# Patient Record
Sex: MALE | URBAN - METROPOLITAN AREA
[De-identification: names, ages, dates, MRNs, and addresses within clinical notes are randomized per-mention and may not be internally consistent; named-entity substitution may affect disease eponyms.]

---

## 2018-01-01 ENCOUNTER — INPATIENT (INPATIENT)
Facility: HOSPITAL | Age: 83
LOS: 0 days | DRG: 190 | End: 2018-02-06
Attending: INTERNAL MEDICINE | Admitting: INTERNAL MEDICINE
Payer: MEDICARE

## 2018-01-01 ENCOUNTER — OUTPATIENT (OUTPATIENT)
Dept: OUTPATIENT SERVICES | Facility: HOSPITAL | Age: 83
LOS: 1 days | End: 2018-01-01
Payer: MEDICAID

## 2018-01-01 ENCOUNTER — INPATIENT (INPATIENT)
Facility: HOSPITAL | Age: 83
LOS: 4 days | Discharge: HOSPICE | DRG: 871 | End: 2018-02-02
Attending: INTERNAL MEDICINE | Admitting: INTERNAL MEDICINE
Payer: MEDICARE

## 2018-01-01 VITALS
WEIGHT: 139.99 LBS | HEART RATE: 134 BPM | OXYGEN SATURATION: 92 % | RESPIRATION RATE: 22 BRPM | SYSTOLIC BLOOD PRESSURE: 96 MMHG | DIASTOLIC BLOOD PRESSURE: 73 MMHG | HEIGHT: 64 IN

## 2018-01-01 VITALS
OXYGEN SATURATION: 91 % | RESPIRATION RATE: 26 BRPM | DIASTOLIC BLOOD PRESSURE: 107 MMHG | HEART RATE: 123 BPM | SYSTOLIC BLOOD PRESSURE: 152 MMHG

## 2018-01-01 VITALS
OXYGEN SATURATION: 90 % | DIASTOLIC BLOOD PRESSURE: 74 MMHG | SYSTOLIC BLOOD PRESSURE: 105 MMHG | HEART RATE: 87 BPM | TEMPERATURE: 98 F | RESPIRATION RATE: 17 BRPM

## 2018-01-01 VITALS
TEMPERATURE: 97 F | HEART RATE: 110 BPM | RESPIRATION RATE: 24 BRPM | OXYGEN SATURATION: 99 % | DIASTOLIC BLOOD PRESSURE: 74 MMHG | SYSTOLIC BLOOD PRESSURE: 96 MMHG

## 2018-01-01 DIAGNOSIS — Z29.9 ENCOUNTER FOR PROPHYLACTIC MEASURES, UNSPECIFIED: ICD-10-CM

## 2018-01-01 DIAGNOSIS — E87.1 HYPO-OSMOLALITY AND HYPONATREMIA: ICD-10-CM

## 2018-01-01 DIAGNOSIS — Z71.89 OTHER SPECIFIED COUNSELING: ICD-10-CM

## 2018-01-01 DIAGNOSIS — Z51.5 ENCOUNTER FOR PALLIATIVE CARE: ICD-10-CM

## 2018-01-01 DIAGNOSIS — R06.02 SHORTNESS OF BREATH: ICD-10-CM

## 2018-01-01 DIAGNOSIS — R69 ILLNESS, UNSPECIFIED: ICD-10-CM

## 2018-01-01 DIAGNOSIS — C34.90 MALIGNANT NEOPLASM OF UNSPECIFIED PART OF UNSPECIFIED BRONCHUS OR LUNG: ICD-10-CM

## 2018-01-01 DIAGNOSIS — J96.90 RESPIRATORY FAILURE, UNSPECIFIED, UNSPECIFIED WHETHER WITH HYPOXIA OR HYPERCAPNIA: ICD-10-CM

## 2018-01-01 DIAGNOSIS — E11.9 TYPE 2 DIABETES MELLITUS WITHOUT COMPLICATIONS: ICD-10-CM

## 2018-01-01 DIAGNOSIS — E43 UNSPECIFIED SEVERE PROTEIN-CALORIE MALNUTRITION: ICD-10-CM

## 2018-01-01 DIAGNOSIS — R53.81 OTHER MALAISE: ICD-10-CM

## 2018-01-01 DIAGNOSIS — J18.9 PNEUMONIA, UNSPECIFIED ORGANISM: ICD-10-CM

## 2018-01-01 LAB
-  COAGULASE NEGATIVE STAPHYLOCOCCUS: SIGNIFICANT CHANGE UP
ACETONE SERPL-MCNC: ABNORMAL
ALBUMIN SERPL ELPH-MCNC: 1.7 G/DL — LOW (ref 3.5–5)
ALBUMIN SERPL ELPH-MCNC: 1.9 G/DL — LOW (ref 3.5–5)
ALBUMIN SERPL ELPH-MCNC: 2 G/DL — LOW (ref 3.5–5)
ALP SERPL-CCNC: 65 U/L — SIGNIFICANT CHANGE UP (ref 40–120)
ALP SERPL-CCNC: 65 U/L — SIGNIFICANT CHANGE UP (ref 40–120)
ALP SERPL-CCNC: 77 U/L — SIGNIFICANT CHANGE UP (ref 40–120)
ALP SERPL-CCNC: 81 U/L — SIGNIFICANT CHANGE UP (ref 40–120)
ALP SERPL-CCNC: 83 U/L — SIGNIFICANT CHANGE UP (ref 40–120)
ALT FLD-CCNC: 43 U/L DA — SIGNIFICANT CHANGE UP (ref 10–60)
ALT FLD-CCNC: 43 U/L DA — SIGNIFICANT CHANGE UP (ref 10–60)
ALT FLD-CCNC: 53 U/L DA — SIGNIFICANT CHANGE UP (ref 10–60)
ALT FLD-CCNC: 71 U/L DA — HIGH (ref 10–60)
ALT FLD-CCNC: 96 U/L DA — HIGH (ref 10–60)
ANION GAP SERPL CALC-SCNC: 10 MMOL/L — SIGNIFICANT CHANGE UP (ref 5–17)
ANION GAP SERPL CALC-SCNC: 10 MMOL/L — SIGNIFICANT CHANGE UP (ref 5–17)
ANION GAP SERPL CALC-SCNC: 11 MMOL/L — SIGNIFICANT CHANGE UP (ref 5–17)
ANION GAP SERPL CALC-SCNC: 11 MMOL/L — SIGNIFICANT CHANGE UP (ref 5–17)
ANION GAP SERPL CALC-SCNC: 12 MMOL/L — SIGNIFICANT CHANGE UP (ref 5–17)
ANION GAP SERPL CALC-SCNC: 13 MMOL/L — SIGNIFICANT CHANGE UP (ref 5–17)
ANION GAP SERPL CALC-SCNC: 14 MMOL/L — SIGNIFICANT CHANGE UP (ref 5–17)
ANION GAP SERPL CALC-SCNC: 15 MMOL/L — SIGNIFICANT CHANGE UP (ref 5–17)
ANION GAP SERPL CALC-SCNC: 15 MMOL/L — SIGNIFICANT CHANGE UP (ref 5–17)
ANION GAP SERPL CALC-SCNC: 9 MMOL/L — SIGNIFICANT CHANGE UP (ref 5–17)
ANION GAP SERPL CALC-SCNC: 9 MMOL/L — SIGNIFICANT CHANGE UP (ref 5–17)
APPEARANCE UR: CLEAR — SIGNIFICANT CHANGE UP
APTT BLD: 24.9 SEC — LOW (ref 27.5–37.4)
AST SERPL-CCNC: 21 U/L — SIGNIFICANT CHANGE UP (ref 10–40)
AST SERPL-CCNC: 23 U/L — SIGNIFICANT CHANGE UP (ref 10–40)
AST SERPL-CCNC: 45 U/L — HIGH (ref 10–40)
AST SERPL-CCNC: 84 U/L — HIGH (ref 10–40)
AST SERPL-CCNC: 89 U/L — HIGH (ref 10–40)
BASE EXCESS BLDA CALC-SCNC: -1.8 MMOL/L — SIGNIFICANT CHANGE UP (ref -2–2)
BASE EXCESS BLDA CALC-SCNC: -2.8 MMOL/L — LOW (ref -2–2)
BASE EXCESS BLDA CALC-SCNC: -2.9 MMOL/L — LOW (ref -2–2)
BASE EXCESS BLDV CALC-SCNC: -3.6 MMOL/L — LOW (ref -2–2)
BASOPHILS # BLD AUTO: 0 K/UL — SIGNIFICANT CHANGE UP (ref 0–0.2)
BASOPHILS NFR BLD AUTO: 0.2 % — SIGNIFICANT CHANGE UP (ref 0–2)
BASOPHILS NFR BLD AUTO: 0.4 % — SIGNIFICANT CHANGE UP (ref 0–2)
BILIRUB SERPL-MCNC: 0.6 MG/DL — SIGNIFICANT CHANGE UP (ref 0.2–1.2)
BILIRUB SERPL-MCNC: 0.8 MG/DL — SIGNIFICANT CHANGE UP (ref 0.2–1.2)
BILIRUB SERPL-MCNC: 0.9 MG/DL — SIGNIFICANT CHANGE UP (ref 0.2–1.2)
BILIRUB SERPL-MCNC: 1 MG/DL — SIGNIFICANT CHANGE UP (ref 0.2–1.2)
BILIRUB SERPL-MCNC: 1 MG/DL — SIGNIFICANT CHANGE UP (ref 0.2–1.2)
BILIRUB UR-MCNC: NEGATIVE — SIGNIFICANT CHANGE UP
BLOOD GAS COMMENTS ARTERIAL: SIGNIFICANT CHANGE UP
BLOOD GAS COMMENTS, VENOUS: SIGNIFICANT CHANGE UP
BUN SERPL-MCNC: 25 MG/DL — HIGH (ref 7–18)
BUN SERPL-MCNC: 27 MG/DL — HIGH (ref 7–18)
BUN SERPL-MCNC: 28 MG/DL — HIGH (ref 7–18)
BUN SERPL-MCNC: 29 MG/DL — HIGH (ref 7–18)
BUN SERPL-MCNC: 36 MG/DL — HIGH (ref 7–18)
BUN SERPL-MCNC: 53 MG/DL — HIGH (ref 7–18)
BUN SERPL-MCNC: 58 MG/DL — HIGH (ref 7–18)
BUN SERPL-MCNC: 64 MG/DL — HIGH (ref 7–18)
CALCIUM SERPL-MCNC: 7.5 MG/DL — LOW (ref 8.4–10.5)
CALCIUM SERPL-MCNC: 7.8 MG/DL — LOW (ref 8.4–10.5)
CALCIUM SERPL-MCNC: 7.8 MG/DL — LOW (ref 8.4–10.5)
CALCIUM SERPL-MCNC: 7.9 MG/DL — LOW (ref 8.4–10.5)
CALCIUM SERPL-MCNC: 8 MG/DL — LOW (ref 8.4–10.5)
CALCIUM SERPL-MCNC: 8.1 MG/DL — LOW (ref 8.4–10.5)
CALCIUM SERPL-MCNC: 8.1 MG/DL — LOW (ref 8.4–10.5)
CALCIUM SERPL-MCNC: 8.2 MG/DL — LOW (ref 8.4–10.5)
CALCIUM SERPL-MCNC: 8.4 MG/DL — SIGNIFICANT CHANGE UP (ref 8.4–10.5)
CALCIUM SERPL-MCNC: 8.5 MG/DL — SIGNIFICANT CHANGE UP (ref 8.4–10.5)
CALCIUM SERPL-MCNC: 8.7 MG/DL — SIGNIFICANT CHANGE UP (ref 8.4–10.5)
CALCIUM SERPL-MCNC: 9.1 MG/DL — SIGNIFICANT CHANGE UP (ref 8.4–10.5)
CHLORIDE SERPL-SCNC: 101 MMOL/L — SIGNIFICANT CHANGE UP (ref 96–108)
CHLORIDE SERPL-SCNC: 103 MMOL/L — SIGNIFICANT CHANGE UP (ref 96–108)
CHLORIDE SERPL-SCNC: 75 MMOL/L — LOW (ref 96–108)
CHLORIDE SERPL-SCNC: 82 MMOL/L — LOW (ref 96–108)
CHLORIDE SERPL-SCNC: 85 MMOL/L — LOW (ref 96–108)
CHLORIDE SERPL-SCNC: 86 MMOL/L — LOW (ref 96–108)
CHLORIDE SERPL-SCNC: 88 MMOL/L — LOW (ref 96–108)
CHLORIDE SERPL-SCNC: 90 MMOL/L — LOW (ref 96–108)
CHLORIDE SERPL-SCNC: 91 MMOL/L — LOW (ref 96–108)
CHLORIDE SERPL-SCNC: 93 MMOL/L — LOW (ref 96–108)
CHLORIDE SERPL-SCNC: 93 MMOL/L — LOW (ref 96–108)
CHLORIDE SERPL-SCNC: 95 MMOL/L — LOW (ref 96–108)
CHLORIDE SERPL-SCNC: 98 MMOL/L — SIGNIFICANT CHANGE UP (ref 96–108)
CHOLEST SERPL-MCNC: 101 MG/DL — SIGNIFICANT CHANGE UP (ref 10–199)
CO2 SERPL-SCNC: 17 MMOL/L — LOW (ref 22–31)
CO2 SERPL-SCNC: 19 MMOL/L — LOW (ref 22–31)
CO2 SERPL-SCNC: 20 MMOL/L — LOW (ref 22–31)
CO2 SERPL-SCNC: 20 MMOL/L — LOW (ref 22–31)
CO2 SERPL-SCNC: 21 MMOL/L — LOW (ref 22–31)
CO2 SERPL-SCNC: 21 MMOL/L — LOW (ref 22–31)
CO2 SERPL-SCNC: 22 MMOL/L — SIGNIFICANT CHANGE UP (ref 22–31)
CO2 SERPL-SCNC: 23 MMOL/L — SIGNIFICANT CHANGE UP (ref 22–31)
CO2 SERPL-SCNC: 23 MMOL/L — SIGNIFICANT CHANGE UP (ref 22–31)
CO2 SERPL-SCNC: 24 MMOL/L — SIGNIFICANT CHANGE UP (ref 22–31)
CO2 SERPL-SCNC: 26 MMOL/L — SIGNIFICANT CHANGE UP (ref 22–31)
COLOR SPEC: YELLOW — SIGNIFICANT CHANGE UP
CREAT ?TM UR-MCNC: 57 MG/DL — SIGNIFICANT CHANGE UP
CREAT SERPL-MCNC: 0.97 MG/DL — SIGNIFICANT CHANGE UP (ref 0.5–1.3)
CREAT SERPL-MCNC: 0.98 MG/DL — SIGNIFICANT CHANGE UP (ref 0.5–1.3)
CREAT SERPL-MCNC: 1.01 MG/DL — SIGNIFICANT CHANGE UP (ref 0.5–1.3)
CREAT SERPL-MCNC: 1.07 MG/DL — SIGNIFICANT CHANGE UP (ref 0.5–1.3)
CREAT SERPL-MCNC: 1.07 MG/DL — SIGNIFICANT CHANGE UP (ref 0.5–1.3)
CREAT SERPL-MCNC: 1.08 MG/DL — SIGNIFICANT CHANGE UP (ref 0.5–1.3)
CREAT SERPL-MCNC: 1.08 MG/DL — SIGNIFICANT CHANGE UP (ref 0.5–1.3)
CREAT SERPL-MCNC: 1.09 MG/DL — SIGNIFICANT CHANGE UP (ref 0.5–1.3)
CREAT SERPL-MCNC: 1.19 MG/DL — SIGNIFICANT CHANGE UP (ref 0.5–1.3)
CREAT SERPL-MCNC: 1.19 MG/DL — SIGNIFICANT CHANGE UP (ref 0.5–1.3)
CREAT SERPL-MCNC: 1.21 MG/DL — SIGNIFICANT CHANGE UP (ref 0.5–1.3)
CREAT SERPL-MCNC: 1.27 MG/DL — SIGNIFICANT CHANGE UP (ref 0.5–1.3)
CREAT SERPL-MCNC: 1.48 MG/DL — HIGH (ref 0.5–1.3)
CREAT SERPL-MCNC: 1.54 MG/DL — HIGH (ref 0.5–1.3)
CREAT SERPL-MCNC: 1.61 MG/DL — HIGH (ref 0.5–1.3)
CULTURE RESULTS: SIGNIFICANT CHANGE UP
D DIMER BLD IA.RAPID-MCNC: 1504 NG/ML DDU — HIGH
DIFF PNL FLD: ABNORMAL
EOSINOPHIL # BLD AUTO: 0 K/UL — SIGNIFICANT CHANGE UP (ref 0–0.5)
EOSINOPHIL NFR BLD AUTO: 0 % — SIGNIFICANT CHANGE UP (ref 0–6)
GLUCOSE SERPL-MCNC: 125 MG/DL — HIGH (ref 70–99)
GLUCOSE SERPL-MCNC: 135 MG/DL — HIGH (ref 70–99)
GLUCOSE SERPL-MCNC: 141 MG/DL — HIGH (ref 70–99)
GLUCOSE SERPL-MCNC: 151 MG/DL — HIGH (ref 70–99)
GLUCOSE SERPL-MCNC: 165 MG/DL — HIGH (ref 70–99)
GLUCOSE SERPL-MCNC: 173 MG/DL — HIGH (ref 70–99)
GLUCOSE SERPL-MCNC: 198 MG/DL — HIGH (ref 70–99)
GLUCOSE SERPL-MCNC: 206 MG/DL — HIGH (ref 70–99)
GLUCOSE SERPL-MCNC: 246 MG/DL — HIGH (ref 70–99)
GLUCOSE SERPL-MCNC: 260 MG/DL — HIGH (ref 70–99)
GLUCOSE SERPL-MCNC: 267 MG/DL — HIGH (ref 70–99)
GLUCOSE SERPL-MCNC: 270 MG/DL — HIGH (ref 70–99)
GLUCOSE SERPL-MCNC: 295 MG/DL — HIGH (ref 70–99)
GLUCOSE SERPL-MCNC: 628 MG/DL — CRITICAL HIGH (ref 70–99)
GLUCOSE SERPL-MCNC: 94 MG/DL — SIGNIFICANT CHANGE UP (ref 70–99)
GLUCOSE UR QL: 250
GRAM STN FLD: SIGNIFICANT CHANGE UP
HBA1C BLD-MCNC: 9.5 % — HIGH (ref 4–5.6)
HCO3 BLDA-SCNC: 20 MMOL/L — LOW (ref 23–27)
HCO3 BLDV-SCNC: 20 MMOL/L — LOW (ref 21–29)
HCT VFR BLD CALC: 43 % — SIGNIFICANT CHANGE UP (ref 39–50)
HCT VFR BLD CALC: 45.6 % — SIGNIFICANT CHANGE UP (ref 39–50)
HCT VFR BLD CALC: 48.9 % — SIGNIFICANT CHANGE UP (ref 39–50)
HCT VFR BLD CALC: 50.2 % — HIGH (ref 39–50)
HCT VFR BLD CALC: 50.5 % — HIGH (ref 39–50)
HCT VFR BLD CALC: 52.6 % — HIGH (ref 39–50)
HDLC SERPL-MCNC: 63 MG/DL — SIGNIFICANT CHANGE UP (ref 40–125)
HGB BLD-MCNC: 14.4 G/DL — SIGNIFICANT CHANGE UP (ref 13–17)
HGB BLD-MCNC: 15 G/DL — SIGNIFICANT CHANGE UP (ref 13–17)
HGB BLD-MCNC: 15.5 G/DL — SIGNIFICANT CHANGE UP (ref 13–17)
HGB BLD-MCNC: 16 G/DL — SIGNIFICANT CHANGE UP (ref 13–17)
HGB BLD-MCNC: 16 G/DL — SIGNIFICANT CHANGE UP (ref 13–17)
HGB BLD-MCNC: 16.4 G/DL — SIGNIFICANT CHANGE UP (ref 13–17)
HOROWITZ INDEX BLDA+IHG-RTO: 40 — SIGNIFICANT CHANGE UP
HOROWITZ INDEX BLDV+IHG-RTO: 40 — SIGNIFICANT CHANGE UP
INR BLD: 1.28 RATIO — HIGH (ref 0.88–1.16)
KETONES UR-MCNC: ABNORMAL
LACTATE SERPL-SCNC: 3.2 MMOL/L — HIGH (ref 0.7–2)
LACTATE SERPL-SCNC: 3.4 MMOL/L — HIGH (ref 0.7–2)
LACTATE SERPL-SCNC: 3.5 MMOL/L — HIGH (ref 0.7–2)
LACTATE SERPL-SCNC: 4.3 MMOL/L — CRITICAL HIGH (ref 0.7–2)
LACTATE SERPL-SCNC: 5.5 MMOL/L — CRITICAL HIGH (ref 0.7–2)
LEGIONELLA AG UR QL: NEGATIVE — SIGNIFICANT CHANGE UP
LEUKOCYTE ESTERASE UR-ACNC: NEGATIVE — SIGNIFICANT CHANGE UP
LIPID PNL WITH DIRECT LDL SERPL: 26 MG/DL — SIGNIFICANT CHANGE UP
LYMPHOCYTES # BLD AUTO: 0.4 K/UL — LOW (ref 1–3.3)
LYMPHOCYTES # BLD AUTO: 0.5 K/UL — LOW (ref 1–3.3)
LYMPHOCYTES # BLD AUTO: 0.5 K/UL — LOW (ref 1–3.3)
LYMPHOCYTES # BLD AUTO: 0.7 K/UL — LOW (ref 1–3.3)
LYMPHOCYTES # BLD AUTO: 3.6 % — LOW (ref 13–44)
LYMPHOCYTES # BLD AUTO: 4.5 % — LOW (ref 13–44)
LYMPHOCYTES # BLD AUTO: 5 % — LOW (ref 13–44)
LYMPHOCYTES # BLD AUTO: 6.5 % — LOW (ref 13–44)
MAGNESIUM SERPL-MCNC: 2 MG/DL — SIGNIFICANT CHANGE UP (ref 1.6–2.6)
MAGNESIUM SERPL-MCNC: 2.1 MG/DL — SIGNIFICANT CHANGE UP (ref 1.6–2.6)
MAGNESIUM SERPL-MCNC: 2.4 MG/DL — SIGNIFICANT CHANGE UP (ref 1.6–2.6)
MAGNESIUM SERPL-MCNC: 2.5 MG/DL — SIGNIFICANT CHANGE UP (ref 1.6–2.6)
MCHC RBC-ENTMCNC: 31.1 PG — SIGNIFICANT CHANGE UP (ref 27–34)
MCHC RBC-ENTMCNC: 31.2 PG — SIGNIFICANT CHANGE UP (ref 27–34)
MCHC RBC-ENTMCNC: 31.3 GM/DL — LOW (ref 32–36)
MCHC RBC-ENTMCNC: 31.3 PG — SIGNIFICANT CHANGE UP (ref 27–34)
MCHC RBC-ENTMCNC: 31.3 PG — SIGNIFICANT CHANGE UP (ref 27–34)
MCHC RBC-ENTMCNC: 31.6 GM/DL — LOW (ref 32–36)
MCHC RBC-ENTMCNC: 31.8 GM/DL — LOW (ref 32–36)
MCHC RBC-ENTMCNC: 31.9 GM/DL — LOW (ref 32–36)
MCHC RBC-ENTMCNC: 32.5 PG — SIGNIFICANT CHANGE UP (ref 27–34)
MCHC RBC-ENTMCNC: 32.7 PG — SIGNIFICANT CHANGE UP (ref 27–34)
MCHC RBC-ENTMCNC: 32.9 GM/DL — SIGNIFICANT CHANGE UP (ref 32–36)
MCHC RBC-ENTMCNC: 33.5 GM/DL — SIGNIFICANT CHANGE UP (ref 32–36)
MCV RBC AUTO: 100.2 FL — HIGH (ref 80–100)
MCV RBC AUTO: 97.5 FL — SIGNIFICANT CHANGE UP (ref 80–100)
MCV RBC AUTO: 97.8 FL — SIGNIFICANT CHANGE UP (ref 80–100)
MCV RBC AUTO: 98 FL — SIGNIFICANT CHANGE UP (ref 80–100)
MCV RBC AUTO: 98.8 FL — SIGNIFICANT CHANGE UP (ref 80–100)
MCV RBC AUTO: 99 FL — SIGNIFICANT CHANGE UP (ref 80–100)
METHOD TYPE: SIGNIFICANT CHANGE UP
MONOCYTES # BLD AUTO: 0.1 K/UL — SIGNIFICANT CHANGE UP (ref 0–0.9)
MONOCYTES # BLD AUTO: 0.3 K/UL — SIGNIFICANT CHANGE UP (ref 0–0.9)
MONOCYTES # BLD AUTO: 0.4 K/UL — SIGNIFICANT CHANGE UP (ref 0–0.9)
MONOCYTES # BLD AUTO: 0.5 K/UL — SIGNIFICANT CHANGE UP (ref 0–0.9)
MONOCYTES NFR BLD AUTO: 1.3 % — LOW (ref 2–14)
MONOCYTES NFR BLD AUTO: 3.3 % — SIGNIFICANT CHANGE UP (ref 2–14)
MONOCYTES NFR BLD AUTO: 3.5 % — SIGNIFICANT CHANGE UP (ref 2–14)
MONOCYTES NFR BLD AUTO: 4.5 % — SIGNIFICANT CHANGE UP (ref 2–14)
NEUTROPHILS # BLD AUTO: 10 K/UL — HIGH (ref 1.8–7.4)
NEUTROPHILS # BLD AUTO: 9.1 K/UL — HIGH (ref 1.8–7.4)
NEUTROPHILS # BLD AUTO: 9.2 K/UL — HIGH (ref 1.8–7.4)
NEUTROPHILS # BLD AUTO: 9.5 K/UL — HIGH (ref 1.8–7.4)
NEUTROPHILS NFR BLD AUTO: 89.6 % — HIGH (ref 43–77)
NEUTROPHILS NFR BLD AUTO: 90.3 % — HIGH (ref 43–77)
NEUTROPHILS NFR BLD AUTO: 92 % — HIGH (ref 43–77)
NEUTROPHILS NFR BLD AUTO: 94.9 % — HIGH (ref 43–77)
NITRITE UR-MCNC: NEGATIVE — SIGNIFICANT CHANGE UP
NT-PROBNP SERPL-SCNC: 8101 PG/ML — HIGH (ref 0–450)
ORGANISM # SPEC MICROSCOPIC CNT: SIGNIFICANT CHANGE UP
ORGANISM # SPEC MICROSCOPIC CNT: SIGNIFICANT CHANGE UP
OSMOLALITY UR: 409 MOS/KG — SIGNIFICANT CHANGE UP (ref 50–1200)
OSMOLALITY UR: 458 MOS/KG — SIGNIFICANT CHANGE UP (ref 50–1200)
PCO2 BLDA: 30 MMHG — LOW (ref 32–46)
PCO2 BLDV: 35 MMHG — SIGNIFICANT CHANGE UP (ref 35–50)
PH BLDA: 7.43 — SIGNIFICANT CHANGE UP (ref 7.35–7.45)
PH BLDA: 7.44 — SIGNIFICANT CHANGE UP (ref 7.35–7.45)
PH BLDA: 7.46 — HIGH (ref 7.35–7.45)
PH BLDV: 7.38 — SIGNIFICANT CHANGE UP (ref 7.35–7.45)
PH UR: 6 — SIGNIFICANT CHANGE UP (ref 5–8)
PHOSPHATE SERPL-MCNC: 3.2 MG/DL — SIGNIFICANT CHANGE UP (ref 2.5–4.5)
PHOSPHATE SERPL-MCNC: 3.2 MG/DL — SIGNIFICANT CHANGE UP (ref 2.5–4.5)
PHOSPHATE SERPL-MCNC: 3.3 MG/DL — SIGNIFICANT CHANGE UP (ref 2.5–4.5)
PLATELET # BLD AUTO: 196 K/UL — SIGNIFICANT CHANGE UP (ref 150–400)
PLATELET # BLD AUTO: 200 K/UL — SIGNIFICANT CHANGE UP (ref 150–400)
PLATELET # BLD AUTO: 219 K/UL — SIGNIFICANT CHANGE UP (ref 150–400)
PLATELET # BLD AUTO: 231 K/UL — SIGNIFICANT CHANGE UP (ref 150–400)
PLATELET # BLD AUTO: 92 K/UL — LOW (ref 150–400)
PLATELET # BLD AUTO: 93 K/UL — LOW (ref 150–400)
PO2 BLDA: 109 MMHG — HIGH (ref 74–108)
PO2 BLDA: 113 MMHG — HIGH (ref 74–108)
PO2 BLDA: 92 MMHG — SIGNIFICANT CHANGE UP (ref 74–108)
PO2 BLDV: <51.5 MMHG — SIGNIFICANT CHANGE UP (ref 25–45)
POTASSIUM SERPL-MCNC: 4.2 MMOL/L — SIGNIFICANT CHANGE UP (ref 3.5–5.3)
POTASSIUM SERPL-MCNC: 4.2 MMOL/L — SIGNIFICANT CHANGE UP (ref 3.5–5.3)
POTASSIUM SERPL-MCNC: 4.3 MMOL/L — SIGNIFICANT CHANGE UP (ref 3.5–5.3)
POTASSIUM SERPL-MCNC: 4.4 MMOL/L — SIGNIFICANT CHANGE UP (ref 3.5–5.3)
POTASSIUM SERPL-MCNC: 4.4 MMOL/L — SIGNIFICANT CHANGE UP (ref 3.5–5.3)
POTASSIUM SERPL-MCNC: 4.5 MMOL/L — SIGNIFICANT CHANGE UP (ref 3.5–5.3)
POTASSIUM SERPL-MCNC: 4.6 MMOL/L — SIGNIFICANT CHANGE UP (ref 3.5–5.3)
POTASSIUM SERPL-MCNC: 4.7 MMOL/L — SIGNIFICANT CHANGE UP (ref 3.5–5.3)
POTASSIUM SERPL-MCNC: 4.8 MMOL/L — SIGNIFICANT CHANGE UP (ref 3.5–5.3)
POTASSIUM SERPL-MCNC: 4.9 MMOL/L — SIGNIFICANT CHANGE UP (ref 3.5–5.3)
POTASSIUM SERPL-MCNC: 5 MMOL/L — SIGNIFICANT CHANGE UP (ref 3.5–5.3)
POTASSIUM SERPL-MCNC: 5.7 MMOL/L — HIGH (ref 3.5–5.3)
POTASSIUM SERPL-MCNC: 6.3 MMOL/L — CRITICAL HIGH (ref 3.5–5.3)
POTASSIUM SERPL-SCNC: 4.2 MMOL/L — SIGNIFICANT CHANGE UP (ref 3.5–5.3)
POTASSIUM SERPL-SCNC: 4.2 MMOL/L — SIGNIFICANT CHANGE UP (ref 3.5–5.3)
POTASSIUM SERPL-SCNC: 4.3 MMOL/L — SIGNIFICANT CHANGE UP (ref 3.5–5.3)
POTASSIUM SERPL-SCNC: 4.4 MMOL/L — SIGNIFICANT CHANGE UP (ref 3.5–5.3)
POTASSIUM SERPL-SCNC: 4.4 MMOL/L — SIGNIFICANT CHANGE UP (ref 3.5–5.3)
POTASSIUM SERPL-SCNC: 4.5 MMOL/L — SIGNIFICANT CHANGE UP (ref 3.5–5.3)
POTASSIUM SERPL-SCNC: 4.6 MMOL/L — SIGNIFICANT CHANGE UP (ref 3.5–5.3)
POTASSIUM SERPL-SCNC: 4.7 MMOL/L — SIGNIFICANT CHANGE UP (ref 3.5–5.3)
POTASSIUM SERPL-SCNC: 4.8 MMOL/L — SIGNIFICANT CHANGE UP (ref 3.5–5.3)
POTASSIUM SERPL-SCNC: 4.9 MMOL/L — SIGNIFICANT CHANGE UP (ref 3.5–5.3)
POTASSIUM SERPL-SCNC: 5 MMOL/L — SIGNIFICANT CHANGE UP (ref 3.5–5.3)
POTASSIUM SERPL-SCNC: 5.7 MMOL/L — HIGH (ref 3.5–5.3)
POTASSIUM SERPL-SCNC: 6.3 MMOL/L — CRITICAL HIGH (ref 3.5–5.3)
POTASSIUM UR-SCNC: 30 MMOL/L — SIGNIFICANT CHANGE UP (ref 25–125)
PROT ?TM UR-MCNC: 118 MG/DL — HIGH (ref 0–12)
PROT SERPL-MCNC: 5.3 G/DL — LOW (ref 6–8.3)
PROT SERPL-MCNC: 5.3 G/DL — LOW (ref 6–8.3)
PROT SERPL-MCNC: 5.4 G/DL — LOW (ref 6–8.3)
PROT SERPL-MCNC: 5.6 G/DL — LOW (ref 6–8.3)
PROT SERPL-MCNC: 6.5 G/DL — SIGNIFICANT CHANGE UP (ref 6–8.3)
PROT UR-MCNC: 100
PROTHROM AB SERPL-ACNC: 14 SEC — HIGH (ref 9.8–12.7)
RAPID RVP RESULT: SIGNIFICANT CHANGE UP
RAPID RVP RESULT: SIGNIFICANT CHANGE UP
RBC # BLD: 4.41 M/UL — SIGNIFICANT CHANGE UP (ref 4.2–5.8)
RBC # BLD: 4.61 M/UL — SIGNIFICANT CHANGE UP (ref 4.2–5.8)
RBC # BLD: 5 M/UL — SIGNIFICANT CHANGE UP (ref 4.2–5.8)
RBC # BLD: 5.12 M/UL — SIGNIFICANT CHANGE UP (ref 4.2–5.8)
RBC # BLD: 5.12 M/UL — SIGNIFICANT CHANGE UP (ref 4.2–5.8)
RBC # BLD: 5.25 M/UL — SIGNIFICANT CHANGE UP (ref 4.2–5.8)
RBC # FLD: 13.8 % — SIGNIFICANT CHANGE UP (ref 10.3–14.5)
RBC # FLD: 14.4 % — SIGNIFICANT CHANGE UP (ref 10.3–14.5)
RBC # FLD: 14.5 % — SIGNIFICANT CHANGE UP (ref 10.3–14.5)
RBC # FLD: 14.6 % — HIGH (ref 10.3–14.5)
S PNEUM AG SER QL: SIGNIFICANT CHANGE UP
SAO2 % BLDA: 96 % — SIGNIFICANT CHANGE UP (ref 92–96)
SAO2 % BLDA: 98 % — HIGH (ref 92–96)
SAO2 % BLDA: 98 % — HIGH (ref 92–96)
SAO2 % BLDV: 81 % — SIGNIFICANT CHANGE UP (ref 67–88)
SODIUM SERPL-SCNC: 110 MMOL/L — CRITICAL LOW (ref 135–145)
SODIUM SERPL-SCNC: 114 MMOL/L — CRITICAL LOW (ref 135–145)
SODIUM SERPL-SCNC: 118 MMOL/L — CRITICAL LOW (ref 135–145)
SODIUM SERPL-SCNC: 119 MMOL/L — CRITICAL LOW (ref 135–145)
SODIUM SERPL-SCNC: 119 MMOL/L — CRITICAL LOW (ref 135–145)
SODIUM SERPL-SCNC: 120 MMOL/L — CRITICAL LOW (ref 135–145)
SODIUM SERPL-SCNC: 121 MMOL/L — LOW (ref 135–145)
SODIUM SERPL-SCNC: 122 MMOL/L — LOW (ref 135–145)
SODIUM SERPL-SCNC: 125 MMOL/L — LOW (ref 135–145)
SODIUM SERPL-SCNC: 125 MMOL/L — LOW (ref 135–145)
SODIUM SERPL-SCNC: 126 MMOL/L — LOW (ref 135–145)
SODIUM SERPL-SCNC: 130 MMOL/L — LOW (ref 135–145)
SODIUM SERPL-SCNC: 133 MMOL/L — LOW (ref 135–145)
SODIUM SERPL-SCNC: 134 MMOL/L — LOW (ref 135–145)
SODIUM SERPL-SCNC: 135 MMOL/L — SIGNIFICANT CHANGE UP (ref 135–145)
SODIUM UR-SCNC: 8 MMOL/L — LOW (ref 40–220)
SODIUM UR-SCNC: 8 MMOL/L — LOW (ref 40–220)
SP GR SPEC: 1.01 — SIGNIFICANT CHANGE UP (ref 1.01–1.02)
SPECIMEN SOURCE: SIGNIFICANT CHANGE UP
TOTAL CHOLESTEROL/HDL RATIO MEASUREMENT: 1.6 RATIO — LOW (ref 3.4–9.6)
TRIGL SERPL-MCNC: 62 MG/DL — SIGNIFICANT CHANGE UP (ref 10–149)
TROPONIN I SERPL-MCNC: 0.03 NG/ML — SIGNIFICANT CHANGE UP (ref 0–0.04)
TSH SERPL-MCNC: 0.83 UU/ML — SIGNIFICANT CHANGE UP (ref 0.34–4.82)
URATE SERPL-MCNC: 5.3 MG/DL — SIGNIFICANT CHANGE UP (ref 3.4–8.8)
UROBILINOGEN FLD QL: NEGATIVE — SIGNIFICANT CHANGE UP
UUN UR-MCNC: 578 MG/DL — SIGNIFICANT CHANGE UP
VIT B12 SERPL-MCNC: >2000 PG/ML — HIGH (ref 232–1245)
WBC # BLD: 10 K/UL — SIGNIFICANT CHANGE UP (ref 3.8–10.5)
WBC # BLD: 10.1 K/UL — SIGNIFICANT CHANGE UP (ref 3.8–10.5)
WBC # BLD: 10.6 K/UL — HIGH (ref 3.8–10.5)
WBC # BLD: 10.6 K/UL — HIGH (ref 3.8–10.5)
WBC # BLD: 10.8 K/UL — HIGH (ref 3.8–10.5)
WBC # BLD: 8.2 K/UL — SIGNIFICANT CHANGE UP (ref 3.8–10.5)
WBC # FLD AUTO: 10 K/UL — SIGNIFICANT CHANGE UP (ref 3.8–10.5)
WBC # FLD AUTO: 10.1 K/UL — SIGNIFICANT CHANGE UP (ref 3.8–10.5)
WBC # FLD AUTO: 10.6 K/UL — HIGH (ref 3.8–10.5)
WBC # FLD AUTO: 10.6 K/UL — HIGH (ref 3.8–10.5)
WBC # FLD AUTO: 10.8 K/UL — HIGH (ref 3.8–10.5)
WBC # FLD AUTO: 8.2 K/UL — SIGNIFICANT CHANGE UP (ref 3.8–10.5)

## 2018-01-01 PROCEDURE — 99285 EMERGENCY DEPT VISIT HI MDM: CPT

## 2018-01-01 PROCEDURE — 93306 TTE W/DOPPLER COMPLETE: CPT

## 2018-01-01 PROCEDURE — 80053 COMPREHEN METABOLIC PANEL: CPT

## 2018-01-01 PROCEDURE — 82803 BLOOD GASES ANY COMBINATION: CPT

## 2018-01-01 PROCEDURE — 81001 URINALYSIS AUTO W/SCOPE: CPT

## 2018-01-01 PROCEDURE — 71045 X-RAY EXAM CHEST 1 VIEW: CPT | Mod: 26

## 2018-01-01 PROCEDURE — 93005 ELECTROCARDIOGRAM TRACING: CPT

## 2018-01-01 PROCEDURE — 85610 PROTHROMBIN TIME: CPT

## 2018-01-01 PROCEDURE — 99223 1ST HOSP IP/OBS HIGH 75: CPT

## 2018-01-01 PROCEDURE — 83735 ASSAY OF MAGNESIUM: CPT

## 2018-01-01 PROCEDURE — 85730 THROMBOPLASTIN TIME PARTIAL: CPT

## 2018-01-01 PROCEDURE — 87486 CHLMYD PNEUM DNA AMP PROBE: CPT

## 2018-01-01 PROCEDURE — 99291 CRITICAL CARE FIRST HOUR: CPT

## 2018-01-01 PROCEDURE — 97110 THERAPEUTIC EXERCISES: CPT

## 2018-01-01 PROCEDURE — 99291 CRITICAL CARE FIRST HOUR: CPT | Mod: 25

## 2018-01-01 PROCEDURE — 87040 BLOOD CULTURE FOR BACTERIA: CPT

## 2018-01-01 PROCEDURE — 82607 VITAMIN B-12: CPT

## 2018-01-01 PROCEDURE — 99285 EMERGENCY DEPT VISIT HI MDM: CPT | Mod: 25

## 2018-01-01 PROCEDURE — 80061 LIPID PANEL: CPT

## 2018-01-01 PROCEDURE — 94660 CPAP INITIATION&MGMT: CPT

## 2018-01-01 PROCEDURE — 82009 KETONE BODYS QUAL: CPT

## 2018-01-01 PROCEDURE — 85027 COMPLETE CBC AUTOMATED: CPT

## 2018-01-01 PROCEDURE — 87633 RESP VIRUS 12-25 TARGETS: CPT

## 2018-01-01 PROCEDURE — 99233 SBSQ HOSP IP/OBS HIGH 50: CPT

## 2018-01-01 PROCEDURE — 87798 DETECT AGENT NOS DNA AMP: CPT

## 2018-01-01 PROCEDURE — 87449 NOS EACH ORGANISM AG IA: CPT

## 2018-01-01 PROCEDURE — 94640 AIRWAY INHALATION TREATMENT: CPT

## 2018-01-01 PROCEDURE — 84550 ASSAY OF BLOOD/URIC ACID: CPT

## 2018-01-01 PROCEDURE — 83935 ASSAY OF URINE OSMOLALITY: CPT

## 2018-01-01 PROCEDURE — 71045 X-RAY EXAM CHEST 1 VIEW: CPT

## 2018-01-01 PROCEDURE — 84300 ASSAY OF URINE SODIUM: CPT

## 2018-01-01 PROCEDURE — 84443 ASSAY THYROID STIM HORMONE: CPT

## 2018-01-01 PROCEDURE — 83605 ASSAY OF LACTIC ACID: CPT

## 2018-01-01 PROCEDURE — 96375 TX/PRO/DX INJ NEW DRUG ADDON: CPT

## 2018-01-01 PROCEDURE — 97162 PT EVAL MOD COMPLEX 30 MIN: CPT

## 2018-01-01 PROCEDURE — 87899 AGENT NOS ASSAY W/OPTIC: CPT

## 2018-01-01 PROCEDURE — 87581 M.PNEUMON DNA AMP PROBE: CPT

## 2018-01-01 PROCEDURE — 84156 ASSAY OF PROTEIN URINE: CPT

## 2018-01-01 PROCEDURE — 82570 ASSAY OF URINE CREATININE: CPT

## 2018-01-01 PROCEDURE — 93010 ELECTROCARDIOGRAM REPORT: CPT

## 2018-01-01 PROCEDURE — G9001: CPT

## 2018-01-01 PROCEDURE — 36415 COLL VENOUS BLD VENIPUNCTURE: CPT

## 2018-01-01 PROCEDURE — 84100 ASSAY OF PHOSPHORUS: CPT

## 2018-01-01 PROCEDURE — 84540 ASSAY OF URINE/UREA-N: CPT

## 2018-01-01 PROCEDURE — 84133 ASSAY OF URINE POTASSIUM: CPT

## 2018-01-01 PROCEDURE — 80048 BASIC METABOLIC PNL TOTAL CA: CPT

## 2018-01-01 PROCEDURE — 83036 HEMOGLOBIN GLYCOSYLATED A1C: CPT

## 2018-01-01 PROCEDURE — 87150 DNA/RNA AMPLIFIED PROBE: CPT

## 2018-01-01 PROCEDURE — 84484 ASSAY OF TROPONIN QUANT: CPT

## 2018-01-01 PROCEDURE — 96374 THER/PROPH/DIAG INJ IV PUSH: CPT

## 2018-01-01 PROCEDURE — 82962 GLUCOSE BLOOD TEST: CPT

## 2018-01-01 RX ORDER — PIPERACILLIN AND TAZOBACTAM 4; .5 G/20ML; G/20ML
3.38 INJECTION, POWDER, LYOPHILIZED, FOR SOLUTION INTRAVENOUS ONCE
Qty: 0 | Refills: 0 | Status: DISCONTINUED | OUTPATIENT
Start: 2018-01-01 | End: 2018-01-01

## 2018-01-01 RX ORDER — MEROPENEM 1 G/30ML
1000 INJECTION INTRAVENOUS EVERY 8 HOURS
Qty: 0 | Refills: 0 | Status: DISCONTINUED | OUTPATIENT
Start: 2018-01-01 | End: 2018-01-01

## 2018-01-01 RX ORDER — MEROPENEM 1 G/30ML
1000 INJECTION INTRAVENOUS ONCE
Qty: 0 | Refills: 0 | Status: COMPLETED | OUTPATIENT
Start: 2018-01-01 | End: 2018-01-01

## 2018-01-01 RX ORDER — DEXTROSE 50 % IN WATER 50 %
50 SYRINGE (ML) INTRAVENOUS ONCE
Qty: 0 | Refills: 0 | Status: COMPLETED | OUTPATIENT
Start: 2018-01-01 | End: 2018-01-01

## 2018-01-01 RX ORDER — DEXTROSE 50 % IN WATER 50 %
25 SYRINGE (ML) INTRAVENOUS ONCE
Qty: 0 | Refills: 0 | Status: DISCONTINUED | OUTPATIENT
Start: 2018-01-01 | End: 2018-01-01

## 2018-01-01 RX ORDER — SODIUM CHLORIDE 9 MG/ML
1000 INJECTION INTRAMUSCULAR; INTRAVENOUS; SUBCUTANEOUS
Qty: 0 | Refills: 0 | Status: DISCONTINUED | OUTPATIENT
Start: 2018-01-01 | End: 2018-01-01

## 2018-01-01 RX ORDER — INFLUENZA VIRUS VACCINE 15; 15; 15; 15 UG/.5ML; UG/.5ML; UG/.5ML; UG/.5ML
0.5 SUSPENSION INTRAMUSCULAR ONCE
Qty: 0 | Refills: 0 | Status: DISCONTINUED | OUTPATIENT
Start: 2018-01-01 | End: 2018-01-01

## 2018-01-01 RX ORDER — INSULIN HUMAN 100 [IU]/ML
10 INJECTION, SOLUTION SUBCUTANEOUS ONCE
Qty: 0 | Refills: 0 | Status: COMPLETED | OUTPATIENT
Start: 2018-01-01 | End: 2018-01-01

## 2018-01-01 RX ORDER — ENOXAPARIN SODIUM 100 MG/ML
40 INJECTION SUBCUTANEOUS DAILY
Qty: 0 | Refills: 0 | Status: DISCONTINUED | OUTPATIENT
Start: 2018-02-07 | End: 2018-01-01

## 2018-01-01 RX ORDER — IPRATROPIUM/ALBUTEROL SULFATE 18-103MCG
3 AEROSOL WITH ADAPTER (GRAM) INHALATION ONCE
Qty: 0 | Refills: 0 | Status: COMPLETED | OUTPATIENT
Start: 2018-01-01 | End: 2018-01-01

## 2018-01-01 RX ORDER — METOPROLOL TARTRATE 50 MG
2.5 TABLET ORAL ONCE
Qty: 0 | Refills: 0 | Status: COMPLETED | OUTPATIENT
Start: 2018-01-01 | End: 2018-01-01

## 2018-01-01 RX ORDER — PIPERACILLIN AND TAZOBACTAM 4; .5 G/20ML; G/20ML
3.38 INJECTION, POWDER, LYOPHILIZED, FOR SOLUTION INTRAVENOUS EVERY 8 HOURS
Qty: 0 | Refills: 0 | Status: DISCONTINUED | OUTPATIENT
Start: 2018-01-01 | End: 2018-01-01

## 2018-01-01 RX ORDER — CALCIUM GLUCONATE 100 MG/ML
1 VIAL (ML) INTRAVENOUS ONCE
Qty: 0 | Refills: 0 | Status: COMPLETED | OUTPATIENT
Start: 2018-01-01 | End: 2018-01-01

## 2018-01-01 RX ORDER — MEROPENEM 1 G/30ML
INJECTION INTRAVENOUS
Qty: 0 | Refills: 0 | Status: DISCONTINUED | OUTPATIENT
Start: 2018-01-01 | End: 2018-01-01

## 2018-01-01 RX ORDER — TIOTROPIUM BROMIDE 18 UG/1
1 CAPSULE ORAL; RESPIRATORY (INHALATION) DAILY
Qty: 0 | Refills: 0 | Status: DISCONTINUED | OUTPATIENT
Start: 2018-01-01 | End: 2018-01-01

## 2018-01-01 RX ORDER — DEXTROSE 50 % IN WATER 50 %
50 SYRINGE (ML) INTRAVENOUS ONCE
Qty: 0 | Refills: 0 | Status: DISCONTINUED | OUTPATIENT
Start: 2018-01-01 | End: 2018-01-01

## 2018-01-01 RX ORDER — SODIUM POLYSTYRENE SULFONATE 4.1 MEQ/G
30 POWDER, FOR SUSPENSION ORAL ONCE
Qty: 0 | Refills: 0 | Status: DISCONTINUED | OUTPATIENT
Start: 2018-01-01 | End: 2018-01-01

## 2018-01-01 RX ORDER — DEXTROSE 50 % IN WATER 50 %
25 SYRINGE (ML) INTRAVENOUS ONCE
Qty: 0 | Refills: 0 | Status: COMPLETED | OUTPATIENT
Start: 2018-01-01 | End: 2018-01-01

## 2018-01-01 RX ORDER — INSULIN GLARGINE 100 [IU]/ML
10 INJECTION, SOLUTION SUBCUTANEOUS AT BEDTIME
Qty: 0 | Refills: 0 | Status: DISCONTINUED | OUTPATIENT
Start: 2018-01-01 | End: 2018-01-01

## 2018-01-01 RX ORDER — IPRATROPIUM/ALBUTEROL SULFATE 18-103MCG
3 AEROSOL WITH ADAPTER (GRAM) INHALATION EVERY 6 HOURS
Qty: 0 | Refills: 0 | Status: DISCONTINUED | OUTPATIENT
Start: 2018-01-01 | End: 2018-01-01

## 2018-01-01 RX ORDER — DEXTROSE 50 % IN WATER 50 %
1 SYRINGE (ML) INTRAVENOUS ONCE
Qty: 0 | Refills: 0 | Status: DISCONTINUED | OUTPATIENT
Start: 2018-01-01 | End: 2018-01-01

## 2018-01-01 RX ORDER — FLUTICASONE PROPIONATE AND SALMETEROL 50; 250 UG/1; UG/1
1 POWDER ORAL; RESPIRATORY (INHALATION)
Qty: 0 | Refills: 0 | COMMUNITY

## 2018-01-01 RX ORDER — METFORMIN HYDROCHLORIDE 850 MG/1
1 TABLET ORAL
Qty: 0 | Refills: 0 | COMMUNITY

## 2018-01-01 RX ORDER — PIPERACILLIN AND TAZOBACTAM 4; .5 G/20ML; G/20ML
3.38 INJECTION, POWDER, LYOPHILIZED, FOR SOLUTION INTRAVENOUS ONCE
Qty: 0 | Refills: 0 | Status: COMPLETED | OUTPATIENT
Start: 2018-01-01 | End: 2018-01-01

## 2018-01-01 RX ORDER — ALBUTEROL 90 UG/1
2 AEROSOL, METERED ORAL
Qty: 0 | Refills: 0 | COMMUNITY

## 2018-01-01 RX ORDER — HYDROCORTISONE 20 MG
200 TABLET ORAL ONCE
Qty: 0 | Refills: 0 | Status: COMPLETED | OUTPATIENT
Start: 2018-01-01 | End: 2018-01-01

## 2018-01-01 RX ORDER — INSULIN LISPRO 100/ML
VIAL (ML) SUBCUTANEOUS
Qty: 0 | Refills: 0 | Status: DISCONTINUED | OUTPATIENT
Start: 2018-01-01 | End: 2018-01-01

## 2018-01-01 RX ORDER — MORPHINE SULFATE 50 MG/1
1 CAPSULE, EXTENDED RELEASE ORAL EVERY 6 HOURS
Qty: 0 | Refills: 0 | Status: DISCONTINUED | OUTPATIENT
Start: 2018-01-01 | End: 2018-01-01

## 2018-01-01 RX ORDER — SODIUM CHLORIDE 9 MG/ML
1000 INJECTION INTRAMUSCULAR; INTRAVENOUS; SUBCUTANEOUS ONCE
Qty: 0 | Refills: 0 | Status: COMPLETED | OUTPATIENT
Start: 2018-01-01 | End: 2018-01-01

## 2018-01-01 RX ORDER — SODIUM CHLORIDE 9 MG/ML
1000 INJECTION, SOLUTION INTRAVENOUS
Qty: 0 | Refills: 0 | Status: DISCONTINUED | OUTPATIENT
Start: 2018-01-01 | End: 2018-01-01

## 2018-01-01 RX ORDER — INSULIN HUMAN 100 [IU]/ML
10 INJECTION, SOLUTION SUBCUTANEOUS ONCE
Qty: 0 | Refills: 0 | Status: DISCONTINUED | OUTPATIENT
Start: 2018-01-01 | End: 2018-01-01

## 2018-01-01 RX ORDER — SODIUM CHLORIDE 9 MG/ML
250 INJECTION INTRAMUSCULAR; INTRAVENOUS; SUBCUTANEOUS ONCE
Qty: 0 | Refills: 0 | Status: COMPLETED | OUTPATIENT
Start: 2018-01-01 | End: 2018-01-01

## 2018-01-01 RX ORDER — ALBUTEROL 90 UG/1
2 AEROSOL, METERED ORAL EVERY 6 HOURS
Qty: 0 | Refills: 0 | Status: DISCONTINUED | OUTPATIENT
Start: 2018-01-01 | End: 2018-01-01

## 2018-01-01 RX ORDER — ENOXAPARIN SODIUM 100 MG/ML
40 INJECTION SUBCUTANEOUS DAILY
Qty: 0 | Refills: 0 | Status: DISCONTINUED | OUTPATIENT
Start: 2018-01-01 | End: 2018-01-01

## 2018-01-01 RX ORDER — ALBUTEROL 90 UG/1
1.25 AEROSOL, METERED ORAL ONCE
Qty: 0 | Refills: 0 | Status: COMPLETED | OUTPATIENT
Start: 2018-01-01 | End: 2018-01-01

## 2018-01-01 RX ORDER — CHLORHEXIDINE GLUCONATE 213 G/1000ML
1 SOLUTION TOPICAL DAILY
Qty: 0 | Refills: 0 | Status: DISCONTINUED | OUTPATIENT
Start: 2018-01-01 | End: 2018-01-01

## 2018-01-01 RX ORDER — ALBUTEROL 90 UG/1
1 AEROSOL, METERED ORAL EVERY 4 HOURS
Qty: 0 | Refills: 0 | Status: DISCONTINUED | OUTPATIENT
Start: 2018-01-01 | End: 2018-01-01

## 2018-01-01 RX ORDER — MORPHINE SULFATE 50 MG/1
0.5 CAPSULE, EXTENDED RELEASE ORAL
Qty: 0 | Refills: 0 | Status: DISCONTINUED | OUTPATIENT
Start: 2018-01-01 | End: 2018-01-01

## 2018-01-01 RX ORDER — VANCOMYCIN HCL 1 G
1000 VIAL (EA) INTRAVENOUS ONCE
Qty: 0 | Refills: 0 | Status: COMPLETED | OUTPATIENT
Start: 2018-01-01 | End: 2018-01-01

## 2018-01-01 RX ORDER — AZITHROMYCIN 500 MG/1
500 TABLET, FILM COATED ORAL EVERY 24 HOURS
Qty: 0 | Refills: 0 | Status: DISCONTINUED | OUTPATIENT
Start: 2018-01-01 | End: 2018-01-01

## 2018-01-01 RX ORDER — SODIUM CHLORIDE 9 MG/ML
3 INJECTION INTRAMUSCULAR; INTRAVENOUS; SUBCUTANEOUS ONCE
Qty: 0 | Refills: 0 | Status: COMPLETED | OUTPATIENT
Start: 2018-01-01 | End: 2018-01-01

## 2018-01-01 RX ORDER — ENOXAPARIN SODIUM 100 MG/ML
60 INJECTION SUBCUTANEOUS ONCE
Qty: 0 | Refills: 0 | Status: COMPLETED | OUTPATIENT
Start: 2018-01-01 | End: 2018-01-01

## 2018-01-01 RX ORDER — DEXTROSE 50 % IN WATER 50 %
12.5 SYRINGE (ML) INTRAVENOUS ONCE
Qty: 0 | Refills: 0 | Status: DISCONTINUED | OUTPATIENT
Start: 2018-01-01 | End: 2018-01-01

## 2018-01-01 RX ORDER — GLUCAGON INJECTION, SOLUTION 0.5 MG/.1ML
1 INJECTION, SOLUTION SUBCUTANEOUS ONCE
Qty: 0 | Refills: 0 | Status: DISCONTINUED | OUTPATIENT
Start: 2018-01-01 | End: 2018-01-01

## 2018-01-01 RX ORDER — PIPERACILLIN AND TAZOBACTAM 4; .5 G/20ML; G/20ML
3.38 INJECTION, POWDER, LYOPHILIZED, FOR SOLUTION INTRAVENOUS EVERY 12 HOURS
Qty: 0 | Refills: 0 | Status: DISCONTINUED | OUTPATIENT
Start: 2018-01-01 | End: 2018-01-01

## 2018-01-01 RX ORDER — MEROPENEM 1 G/30ML
1000 INJECTION INTRAVENOUS ONCE
Qty: 0 | Refills: 0 | Status: DISCONTINUED | OUTPATIENT
Start: 2018-01-01 | End: 2018-01-01

## 2018-01-01 RX ORDER — AZITHROMYCIN 500 MG/1
500 TABLET, FILM COATED ORAL EVERY 24 HOURS
Qty: 0 | Refills: 0 | Status: COMPLETED | OUTPATIENT
Start: 2018-01-01 | End: 2018-01-01

## 2018-01-01 RX ADMIN — AZITHROMYCIN 250 MILLIGRAM(S): 500 TABLET, FILM COATED ORAL at 22:01

## 2018-01-01 RX ADMIN — SODIUM CHLORIDE 4000 MILLILITER(S): 9 INJECTION INTRAMUSCULAR; INTRAVENOUS; SUBCUTANEOUS at 15:43

## 2018-01-01 RX ADMIN — PIPERACILLIN AND TAZOBACTAM 25 GRAM(S): 4; .5 INJECTION, POWDER, LYOPHILIZED, FOR SOLUTION INTRAVENOUS at 05:26

## 2018-01-01 RX ADMIN — Medication 3 MILLILITER(S): at 15:00

## 2018-01-01 RX ADMIN — INSULIN GLARGINE 10 UNIT(S): 100 INJECTION, SOLUTION SUBCUTANEOUS at 22:20

## 2018-01-01 RX ADMIN — Medication 3 MILLILITER(S): at 08:15

## 2018-01-01 RX ADMIN — SODIUM CHLORIDE 3 MILLILITER(S): 9 INJECTION INTRAMUSCULAR; INTRAVENOUS; SUBCUTANEOUS at 15:40

## 2018-01-01 RX ADMIN — Medication 3 MILLILITER(S): at 14:45

## 2018-01-01 RX ADMIN — Medication 1: at 16:49

## 2018-01-01 RX ADMIN — PIPERACILLIN AND TAZOBACTAM 12.5 GRAM(S): 4; .5 INJECTION, POWDER, LYOPHILIZED, FOR SOLUTION INTRAVENOUS at 05:07

## 2018-01-01 RX ADMIN — ENOXAPARIN SODIUM 40 MILLIGRAM(S): 100 INJECTION SUBCUTANEOUS at 11:34

## 2018-01-01 RX ADMIN — Medication 40 MILLIGRAM(S): at 05:19

## 2018-01-01 RX ADMIN — Medication 40 MILLIGRAM(S): at 14:27

## 2018-01-01 RX ADMIN — ENOXAPARIN SODIUM 40 MILLIGRAM(S): 100 INJECTION SUBCUTANEOUS at 11:59

## 2018-01-01 RX ADMIN — PIPERACILLIN AND TAZOBACTAM 12.5 GRAM(S): 4; .5 INJECTION, POWDER, LYOPHILIZED, FOR SOLUTION INTRAVENOUS at 22:02

## 2018-01-01 RX ADMIN — PIPERACILLIN AND TAZOBACTAM 12.5 GRAM(S): 4; .5 INJECTION, POWDER, LYOPHILIZED, FOR SOLUTION INTRAVENOUS at 14:44

## 2018-01-01 RX ADMIN — AZITHROMYCIN 250 MILLIGRAM(S): 500 TABLET, FILM COATED ORAL at 22:54

## 2018-01-01 RX ADMIN — Medication 50 MILLILITER(S): at 23:29

## 2018-01-01 RX ADMIN — INSULIN GLARGINE 10 UNIT(S): 100 INJECTION, SOLUTION SUBCUTANEOUS at 22:01

## 2018-01-01 RX ADMIN — INSULIN HUMAN 10 UNIT(S): 100 INJECTION, SOLUTION SUBCUTANEOUS at 23:28

## 2018-01-01 RX ADMIN — Medication 40 MILLIGRAM(S): at 05:27

## 2018-01-01 RX ADMIN — Medication 40 MILLIGRAM(S): at 14:43

## 2018-01-01 RX ADMIN — Medication 3 MILLILITER(S): at 09:59

## 2018-01-01 RX ADMIN — Medication 3 MILLILITER(S): at 15:32

## 2018-01-01 RX ADMIN — MEROPENEM 100 MILLIGRAM(S): 1 INJECTION INTRAVENOUS at 22:53

## 2018-01-01 RX ADMIN — Medication 3 MILLILITER(S): at 14:23

## 2018-01-01 RX ADMIN — Medication 108 MILLIGRAM(S): at 18:14

## 2018-01-01 RX ADMIN — PIPERACILLIN AND TAZOBACTAM 25 GRAM(S): 4; .5 INJECTION, POWDER, LYOPHILIZED, FOR SOLUTION INTRAVENOUS at 21:55

## 2018-01-01 RX ADMIN — Medication 40 MILLIGRAM(S): at 05:06

## 2018-01-01 RX ADMIN — Medication 3 MILLILITER(S): at 18:11

## 2018-01-01 RX ADMIN — SODIUM CHLORIDE 125 MILLILITER(S): 9 INJECTION INTRAMUSCULAR; INTRAVENOUS; SUBCUTANEOUS at 18:57

## 2018-01-01 RX ADMIN — Medication 25 GRAM(S): at 08:08

## 2018-01-01 RX ADMIN — CHLORHEXIDINE GLUCONATE 1 APPLICATION(S): 213 SOLUTION TOPICAL at 14:43

## 2018-01-01 RX ADMIN — ENOXAPARIN SODIUM 40 MILLIGRAM(S): 100 INJECTION SUBCUTANEOUS at 12:06

## 2018-01-01 RX ADMIN — Medication 3 MILLILITER(S): at 09:36

## 2018-01-01 RX ADMIN — MEROPENEM 100 MILLIGRAM(S): 1 INJECTION INTRAVENOUS at 11:58

## 2018-01-01 RX ADMIN — Medication 2.5 MILLIGRAM(S): at 02:27

## 2018-01-01 RX ADMIN — Medication 1: at 11:28

## 2018-01-01 RX ADMIN — ALBUTEROL 1.25 MILLIGRAM(S): 90 AEROSOL, METERED ORAL at 23:29

## 2018-01-01 RX ADMIN — SODIUM CHLORIDE 125 MILLILITER(S): 9 INJECTION INTRAMUSCULAR; INTRAVENOUS; SUBCUTANEOUS at 01:47

## 2018-01-01 RX ADMIN — CHLORHEXIDINE GLUCONATE 1 APPLICATION(S): 213 SOLUTION TOPICAL at 11:59

## 2018-01-01 RX ADMIN — PIPERACILLIN AND TAZOBACTAM 200 GRAM(S): 4; .5 INJECTION, POWDER, LYOPHILIZED, FOR SOLUTION INTRAVENOUS at 18:12

## 2018-01-01 RX ADMIN — MEROPENEM 100 MILLIGRAM(S): 1 INJECTION INTRAVENOUS at 06:50

## 2018-01-01 RX ADMIN — CHLORHEXIDINE GLUCONATE 1 APPLICATION(S): 213 SOLUTION TOPICAL at 12:10

## 2018-01-01 RX ADMIN — Medication 100 GRAM(S): at 18:14

## 2018-01-01 RX ADMIN — Medication 3 MILLILITER(S): at 21:06

## 2018-01-01 RX ADMIN — ENOXAPARIN SODIUM 60 MILLIGRAM(S): 100 INJECTION SUBCUTANEOUS at 06:26

## 2018-01-01 RX ADMIN — PIPERACILLIN AND TAZOBACTAM 12.5 GRAM(S): 4; .5 INJECTION, POWDER, LYOPHILIZED, FOR SOLUTION INTRAVENOUS at 14:27

## 2018-01-01 RX ADMIN — Medication 40 MILLIGRAM(S): at 14:02

## 2018-01-01 RX ADMIN — MEROPENEM 100 MILLIGRAM(S): 1 INJECTION INTRAVENOUS at 05:59

## 2018-01-01 RX ADMIN — Medication 250 MILLIGRAM(S): at 18:56

## 2018-01-01 RX ADMIN — Medication 3 MILLILITER(S): at 02:39

## 2018-01-01 RX ADMIN — CHLORHEXIDINE GLUCONATE 1 APPLICATION(S): 213 SOLUTION TOPICAL at 12:00

## 2018-01-01 RX ADMIN — PIPERACILLIN AND TAZOBACTAM 12.5 GRAM(S): 4; .5 INJECTION, POWDER, LYOPHILIZED, FOR SOLUTION INTRAVENOUS at 05:19

## 2018-01-01 RX ADMIN — AZITHROMYCIN 250 MILLIGRAM(S): 500 TABLET, FILM COATED ORAL at 22:10

## 2018-01-01 RX ADMIN — Medication 3 MILLILITER(S): at 08:58

## 2018-01-01 RX ADMIN — INSULIN GLARGINE 10 UNIT(S): 100 INJECTION, SOLUTION SUBCUTANEOUS at 21:45

## 2018-01-01 RX ADMIN — Medication 3 MILLILITER(S): at 20:48

## 2018-01-01 RX ADMIN — Medication 3 MILLILITER(S): at 15:33

## 2018-01-01 RX ADMIN — SODIUM CHLORIDE 50 MILLILITER(S): 9 INJECTION INTRAMUSCULAR; INTRAVENOUS; SUBCUTANEOUS at 20:16

## 2018-01-01 RX ADMIN — Medication 3 MILLILITER(S): at 20:30

## 2018-01-01 RX ADMIN — CHLORHEXIDINE GLUCONATE 1 APPLICATION(S): 213 SOLUTION TOPICAL at 12:11

## 2018-01-01 RX ADMIN — MEROPENEM 100 MILLIGRAM(S): 1 INJECTION INTRAVENOUS at 21:45

## 2018-01-01 RX ADMIN — Medication 3 MILLILITER(S): at 14:37

## 2018-01-01 RX ADMIN — Medication 3 MILLILITER(S): at 02:07

## 2018-01-01 RX ADMIN — Medication 3 MILLILITER(S): at 08:19

## 2018-01-01 RX ADMIN — AZITHROMYCIN 250 MILLIGRAM(S): 500 TABLET, FILM COATED ORAL at 22:05

## 2018-01-01 RX ADMIN — ENOXAPARIN SODIUM 40 MILLIGRAM(S): 100 INJECTION SUBCUTANEOUS at 22:05

## 2018-01-01 RX ADMIN — INSULIN GLARGINE 10 UNIT(S): 100 INJECTION, SOLUTION SUBCUTANEOUS at 22:18

## 2018-01-01 RX ADMIN — Medication 1: at 12:05

## 2018-01-01 RX ADMIN — Medication 40 MILLIGRAM(S): at 22:01

## 2018-01-01 RX ADMIN — Medication 3: at 05:18

## 2018-01-01 RX ADMIN — Medication 3 MILLILITER(S): at 03:23

## 2018-01-01 RX ADMIN — Medication 3 MILLILITER(S): at 16:10

## 2018-01-01 RX ADMIN — PIPERACILLIN AND TAZOBACTAM 12.5 GRAM(S): 4; .5 INJECTION, POWDER, LYOPHILIZED, FOR SOLUTION INTRAVENOUS at 22:05

## 2018-01-01 RX ADMIN — Medication 125 MILLIGRAM(S): at 15:43

## 2018-01-01 RX ADMIN — Medication 40 MILLIGRAM(S): at 06:11

## 2018-01-01 RX ADMIN — MEROPENEM 100 MILLIGRAM(S): 1 INJECTION INTRAVENOUS at 14:23

## 2018-01-01 RX ADMIN — ENOXAPARIN SODIUM 40 MILLIGRAM(S): 100 INJECTION SUBCUTANEOUS at 12:10

## 2018-01-01 RX ADMIN — Medication 3 MILLILITER(S): at 02:44

## 2018-01-01 RX ADMIN — SODIUM CHLORIDE 500 MILLILITER(S): 9 INJECTION INTRAMUSCULAR; INTRAVENOUS; SUBCUTANEOUS at 05:59

## 2018-01-28 NOTE — ED ADULT NURSE NOTE - OBJECTIVE STATEMENT
nira from home with breathing difficulty for a week worse today patient has h/o lung cancer on radiation therapy on arrival lethargic arousable tachypneic tachy cardic and labored

## 2018-01-28 NOTE — H&P ADULT - PROBLEM SELECTOR PLAN 1
Likely 2/2 COPD exacerbation due to multifocal pneumonia  CXR shows Right lower lobe consolidation.  Tachypnea to 40 and desaturation to 70 SpO2  Placed on BiPAP  Given zosyn + Levaquin  Lactate 5.5 s/p 1.5 L bolus  NPO for now  f/u BCx and RVP

## 2018-01-28 NOTE — H&P ADULT - NEUROLOGICAL DETAILS
responds to pain/deep reflexes intact/responds to verbal commands/cranial nerves intact/no spontaneous movement/sensation intact

## 2018-01-28 NOTE — ED PROVIDER NOTE - CONSTITUTIONAL, MLM
normal... Well appearing, well nourished, awake, alert, oriented to person, place, time/situation and in no apparent distress. resp distress

## 2018-01-28 NOTE — H&P ADULT - GASTROINTESTINAL DETAILS
no masses palpable/no bruit/no distention/soft/nontender/bowel sounds normal/no rebound tenderness/normal

## 2018-01-28 NOTE — ED PROVIDER NOTE - MEDICAL DECISION MAKING DETAILS
Pt is a 83 y/o M c/o SOB. Will check labs, CXR, blood word, resume treatment post obstructive pneumonia, does not currently meet sepsis criteria but suspect that will meet after checking blood results. Pt is a 81 y/o M c/o SOB. Will check labs, CXR, blood work, presume treatment post obstructive pneumonia, does currently meet sepsis criteria (tachypnia, tachycardia). broad spectrum abx

## 2018-01-28 NOTE — ED PROVIDER NOTE - OBJECTIVE STATEMENT
Pt is a 81 y/o M with a significant PMHx stage IIIB lung cancer of and no significant PSHx presents to the ED c/o worsening SOB x2-3 days. Pt normally f/u Dr. Riley [phone], Dr. Thomas [phone]. Pt states that he is chronically on O2, his usual O2 saturation is in the 70-90% Pt was BIB EMS who state a lower O2 sat PTA. Pt is accompanied by son and , is unable to be his own historian by himself. Pt denies f/c or any other complaints. NKDA or allergies: Pt is a 81 y/o M with a significant PMHx of stage IIIB lung cancer and no significant PSHx presents to the ED c/o worsening SOB x2-3 days. Pt normally f/u Dr. Riley [phone], Dr. Thomas [phone]. Pt states that he is chronically on O2, his usual O2 saturation is in the 70-90% Pt was BIB EMS who state a lower O2 sat PTA. Pt is accompanied by son and , is unable to be his own historian by himself. Pt denies f/c or any other complaints. NKDA or allergies: Pt is a 83 y/o M with a significant PMHx of stage IIIB lung cancer and no significant PSHx presents to the ED c/o worsening SOB x2-3 days. Pt normally f/u Dr. Riley [phone], Dr. Thomas [phone]. Pt states that he is chronically on O2, his usual O2 saturation is in the 70-90% Pt was BIB EMS who state a lower O2 sat PTA. Pt is accompanied by son and friend acting as a . Pt denies f/c or any other complaints. NKDA or allergies:

## 2018-01-28 NOTE — H&P ADULT - ASSESSMENT
82 F from home lives with son, ambulates with personal assistance, PMH of COPD, right side lung Ca stage 3b brought in for severe respiratory distress.

## 2018-01-28 NOTE — H&P ADULT - ATTENDING COMMENTS
Patient seen and examined at bedside with resident in the ED.  This is 82year old man ex-smoker with PMH as listed above including COPD (on home oxygen), Stage 3B right lung Cancer s/p chemo/RT was brought to the ED because of few days of worsening SOB  associated with cough, generalized weakness and poor oral intake. In the ED he was noted to be tachypneic, hypoxemic and placed on bipap with good response. CXR showed consolidation and he was started on antibiotics and given 1.5L NS in view of sepsis by the ED team. Blood tests results remarkable for severe hyponatremia at 114 and repeat 119 post-IV fluid 1.5L bolus in ED.  On ICU evaluation, BP stable, -130/min, RR 26 on BIPAP 15/5/Rate 24/40%, afebrile, elderly man, resting comfortably in bed on bipap, awake, alert, follows commands, moves all extremities, Lungs- bilateral air entry with bibasilar crackles, Heart- sinus tachycardia, no murmurs, abdomen- full, soft, nontender, +BS, ext- bilateral pitting pedal edema.  LABS reviewed: Mild leukocytosis, Na 114 and repeat 119, K 6.3 (slightly hemolyzed), lactate 5.5 and repeat 4.3, CXR showed bibasilar infiltrates. EKG - sinus tachycardia with PACs, no ST, T wave changes.  A/P  Acute hypoxemic respiratory failure from pneumonia, possibly post-obstructive in view of history of stage 3B NSCLC s/p chemo/RT. Also hypovolemic hyponatremia likely from poor oral intake.  - Continue bipap support for now.  -Start IV Zosyn for post-obstructive pneumonia and add azithromycin for atypical coverage.  -Send blood cultures, urine legionella antigen, Respiratory viral panel including influenza/RSV screen.  -Hold off on IV fluid for now in view of serum sodium elevation from 114 to 119 after 1.5L NS given in the ED.  -Monitor serum Na q4hrs for now and aim to correct not more than 8meq/L in the first 24hrs.  -Repeat serum potassium, initial sample hemolyzed.  -Trend Lactate until <2  -To call patient's pulmonologist and oncologist to obtain more info about patient.  -D/w patient's son at bedside and he made patient DNR/DNI.  -Will admit to ICU and downgrade to floor once serum sodium has been slowly corrected to the 120's.

## 2018-01-28 NOTE — H&P ADULT - DOES THIS PATIENT HAVE A HISTORY OF OR HAS BEEN DX WITH HEART FAILURE?
unknown Complex Repair And O-L Flap Text: The defect edges were debeveled with a #15 scalpel blade.  The primary defect was closed partially with a complex linear closure.  Given the location of the remaining defect, shape of the defect and the proximity to free margins an O-L flap was deemed most appropriate for complete closure of the defect.  Using a sterile surgical marker, an appropriate flap was drawn incorporating the defect and placing the expected incisions within the relaxed skin tension lines where possible.    The area thus outlined was incised deep to adipose tissue with a #15 scalpel blade.  The skin margins were undermined to an appropriate distance in all directions utilizing iris scissors.

## 2018-01-28 NOTE — ED PROVIDER NOTE - PROGRESS NOTE DETAILS
Discussed with Dr. Thomas, pt does not have a DNR in place and does not know prognosis. Discussed with Dr. Ralph regarding ICU admission. Pt is vastly improved in respiratory status after being put on BIPAP, no longer in respiratory distress. 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 Will be giving steroids presuming that this is an adrenal insufficiency. Pt on chronic prednisone. 16:30 call back from lab regarding hyponatremia, hyperkalemia, will stop fluids due to concern for causing demyelination syndrome. Will be giving steroids presuming that this is an adrenal insufficiency. Pt on chronic prednisone. ortiz placed, urine lytes sent. repeat bmp pending. pt did receive 1500 ml NS prior to stopping fluids.

## 2018-01-28 NOTE — H&P ADULT - HISTORY OF PRESENT ILLNESS
82 F from home lives with son, ambulates with personal assistance, PMH of right side lung Ca stage 3b S/P chemoradiation therapy since last year, DM, HTN, brought in for SOB for few days. Dyspnea is getting worse and today patient was in severe respiratory distress. Dyspnea is associated with weakness, confusion, cough and decreased appetite. He is confused for 1 day. Cough is productive with scant sputum. Patient was smoker in past.     As per son patient had no fever, chest pain, rash, abdominal pain, diarrhea, nausea, vomiting, current smoking, alcohol abuse and illicit drug use.    Vital Signs Last 24 Hrs  T(C): 37.3 (28 Jan 2018 15:46), Max: 37.3 (28 Jan 2018 15:46)  T(F): 99.1 (28 Jan 2018 15:46), Max: 99.1 (28 Jan 2018 15:46)  HR: 127 (28 Jan 2018 15:46) (123 - 130)  BP: 144/90 (28 Jan 2018 15:46) (144/90 - 152/107)  RR: 28 (28 Jan 2018 15:46) (26 - 28)  SpO2: 92% (28 Jan 2018 15:46) (91% - 96%) 82 F from home lives with son, ambulates with personal assistance, PMH of right side lung Ca stage 3b S/P chemoradiation therapy since last year, COPD, DM, brought in for SOB for few days. Dyspnea is getting worse and today patient was in severe respiratory distress. Dyspnea is associated with weakness, confusion, cough and decreased appetite. He is confused for 1 day. Cough is productive with scant sputum. Patient was smoker in past.     As per son patient had no fever, chest pain, rash, abdominal pain, diarrhea, nausea, vomiting, current smoking, alcohol abuse and illicit drug use.    Vital Signs Last 24 Hrs  T(C): 37.3 (28 Jan 2018 15:46), Max: 37.3 (28 Jan 2018 15:46)  T(F): 99.1 (28 Jan 2018 15:46), Max: 99.1 (28 Jan 2018 15:46)  HR: 127 (28 Jan 2018 15:46) (123 - 130)  BP: 144/90 (28 Jan 2018 15:46) (144/90 - 152/107)  RR: 28 (28 Jan 2018 15:46) (26 - 28)  SpO2: 92% (28 Jan 2018 15:46) (91% - 96%)

## 2018-01-29 NOTE — CONSULT NOTE ADULT - ASSESSMENT
82 F from home lives with son, ambulates with personal assistance, PMH of right side lung Ca stage 3b S/P chemoradiation therapy since last year, COPD, DM, brought in for SOB for few days. Dyspnea Progressed to severe respiratory distress.   Patient was admitted to ICU for pneumonia and respiratory failure and was started on BIPAP mask, and IV Zosyn and Azithromycin.

## 2018-01-29 NOTE — PROGRESS NOTE ADULT - PROBLEM SELECTOR PLAN 2
Likely 2/2 decreased oral intake  s/p 1.5 L NS bolus  114-->119  F/u Urinelytes and uric acid Likely 2/2 decreased oral intake  urine lytes s/o urine osm of 409 and urine sodium of 8  s/p 1.5 L NS bolus  114-->119--> 122  goal is to keep around 122 by 330 pm today, avoid overcorrection  monitor BMP Q4h

## 2018-01-29 NOTE — PROGRESS NOTE ADULT - SUBJECTIVE AND OBJECTIVE BOX
INTERVAL /OVERNIGHT EVENTS: No acute events overnight, patient was on BIPAP.    PRESSORS: [ ] YES [ ] NO  WHICH:    ANTIBIOTICS:                  DATE STARTED:  ANTIBIOTICS:                  DATE STARTED:  ANTIBIOTICS:                  DATE STARTED:    Antimicrobial:  azithromycin  IVPB 500 milliGRAM(s) IV Intermittent every 24 hours  piperacillin/tazobactam IVPB. 3.375 Gram(s) IV Intermittent every 8 hours    Cardiovascular:    Pulmonary:  ALBUTerol/ipratropium for Nebulization 3 milliLiter(s) Nebulizer every 6 hours    Hematalogic:  enoxaparin Injectable 40 milliGRAM(s) SubCutaneous daily    Other:  influenza   Vaccine 0.5 milliLiter(s) IntraMuscular once  insulin glargine Injectable (LANTUS) 10 Unit(s) SubCutaneous at bedtime  insulin lispro (HumaLOG) corrective regimen sliding scale   SubCutaneous three times a day before meals  methylPREDNISolone sodium succinate Injectable 40 milliGRAM(s) IV Push every 8 hours  sodium chloride 0.9%. 1000 milliLiter(s) IV Continuous <Continuous>    ALBUTerol/ipratropium for Nebulization 3 milliLiter(s) Nebulizer every 6 hours  azithromycin  IVPB 500 milliGRAM(s) IV Intermittent every 24 hours  enoxaparin Injectable 40 milliGRAM(s) SubCutaneous daily  influenza   Vaccine 0.5 milliLiter(s) IntraMuscular once  insulin glargine Injectable (LANTUS) 10 Unit(s) SubCutaneous at bedtime  insulin lispro (HumaLOG) corrective regimen sliding scale   SubCutaneous three times a day before meals  methylPREDNISolone sodium succinate Injectable 40 milliGRAM(s) IV Push every 8 hours  piperacillin/tazobactam IVPB. 3.375 Gram(s) IV Intermittent every 8 hours  sodium chloride 0.9%. 1000 milliLiter(s) IV Continuous <Continuous>    Drug Dosing Weight  Height (cm): 162.56 (2018 21:02)  Weight (kg): 67.5 (2018 21:02)  BMI (kg/m2): 25.5 (2018 21:02)  BSA (m2): 1.73 (2018 21:02)    CENTRAL LINE: [ ] YES [ ] NO  LOCATION:   DATE INSERTED:  REMOVE: [ ] YES [ ] NO  EXPLAIN:    ORTIZ: [ ] YES [ ] NO    DATE INSERTED:  REMOVE:  [ ] YES [ ] NO  EXPLAIN:    A-LINE:  [ ] YES [ ] NO  LOCATION:   DATE INSERTED:  REMOVE:  [ ] YES [ ] NO  EXPLAIN:    PMH -reviewed admission note, no change since admission  Heart faliure: acute [ ] chronic [ ] acute or chronic [ ] diastolic [ ] systolic [ ] combied systolic and diastolic[ ]  SHAVON: ATN[ ] renal medullary necrosis [ ] CKD I [ ]CKDII [ ]CKD III [ ]CKD IV [ ]CKD V [ ]Other pathological lesions [ ]  Abdominal Nutrition Status: malnutrition [ ] cachexia [ ] morbid obesity/BMI=40 [ ] Supplement ordered [___________]     ICU Vital Signs Last 24 Hrs  T(C): 35.6 (2018 04:30), Max: 37.3 (2018 15:46)  T(F): 96 (2018 04:30), Max: 99.2 (2018 19:20)  HR: 118 (2018 06:30) (117 - 134)  BP: 103/87 (2018 06:30) (103/84 - 170/99)  BP(mean): 91 (2018 06:30) (86 - 114)  ABP: --  ABP(mean): --  RR: 14 (2018 06:30) (14 - 34)  SpO2: 96% (2018 06:30) (91% - 100%)      ABG - ( 2018 04:49 )  pH: 7.44  /  pCO2: 30    /  pO2: 113   / HCO3: 20    / Base Excess: -2.8  /  SaO2: 98                     @ 07:01  -   @ 07:00  --------------------------------------------------------  IN: 450 mL / OUT: 700 mL / NET: -250 mL            PHYSICAL EXAM:    GENERAL:   HEAD: atraumatic, normocephalic   EYES: PERRLA, white sclera.   ENMT: nasal mucosa- Oral cavity-   NECK: supple, JVD/ no JVD, thyroid-   LYMPH: no palpable lymph nodes     SKIN: warm, dry   CHEST/LUNG: ET tube: size        cm at lip. No Chest deformity , no chest tenderness. bilateral breath sounds,no  adventitious sounds  HEART: RRR, no m/r/g   ABDOMEN: soft, nontender, nondistended; bowel sounds.  :ortiz catheter.  EXTREMITIES: +1 non-pitting edema, no cyanosis, no clubbing.  NEURO: AA0X , mood/ affect-, no focal neuro deficits        LABS:  CBC Full  -  ( 2018 04:17 )  WBC Count : 8.2 K/uL  Hemoglobin : 15.0 g/dL  Hematocrit : 45.6 %  Platelet Count - Automated : 200 K/uL  Mean Cell Volume : 99.0 fl  Mean Cell Hemoglobin : 32.5 pg  Mean Cell Hemoglobin Concentration : 32.9 gm/dL  Auto Neutrophil # : x  Auto Lymphocyte # : x  Auto Monocyte # : x  Auto Eosinophil # : x  Auto Basophil # : x  Auto Neutrophil % : x  Auto Lymphocyte % : x  Auto Monocyte % : x  Auto Eosinophil % : x  Auto Basophil % : x        119<LL>  |  85<L>  |  27<H>  ----------------------------<  267<H>  4.6   |  22  |  1.07    Ca    8.0<L>      2018 04:17  Phos  3.2       Mg     2.0         TPro  5.6<L>  /  Alb  1.9<L>  /  TBili  0.8  /  DBili  x   /  AST  23  /  ALT  43  /  AlkPhos  65      PT/INR - ( 2018 15:37 )   PT: 14.0 sec;   INR: 1.28 ratio         PTT - ( 2018 15:37 )  PTT:24.9 sec  Urinalysis Basic - ( 2018 17:52 )    Color: Yellow / Appearance: Clear / S.015 / pH: x  Gluc: x / Ketone: Small  / Bili: Negative / Urobili: Negative   Blood: x / Protein: 100 / Nitrite: Negative   Leuk Esterase: Negative / RBC: 5-10 /HPF / WBC 0-2 /HPF   Sq Epi: x / Non Sq Epi: Few /HPF / Bacteria: Few /HPF          RADIOLOGY & ADDITIONAL STUDIES REVIEWED:     [ ]GOALS OF CARE DISCUSSION WITH PATIENT/FAMILY/PROXY:    CRITICAL CARE TIME SPENT: 35 minutes INTERVAL /OVERNIGHT EVENTS: No acute events overnight, patient was on BIPAP.    PRESSORS: [ ] YES [x ] NO  WHICH:    ANTIBIOTICS:   zosyn               DATE STARTED:  ANTIBIOTICS:        zithromax          DATE STARTED:  ANTIBIOTICS:                  DATE STARTED:    Antimicrobial:  azithromycin  IVPB 500 milliGRAM(s) IV Intermittent every 24 hours  piperacillin/tazobactam IVPB. 3.375 Gram(s) IV Intermittent every 8 hours    Cardiovascular:    Pulmonary:  ALBUTerol/ipratropium for Nebulization 3 milliLiter(s) Nebulizer every 6 hours    Hematalogic:  enoxaparin Injectable 40 milliGRAM(s) SubCutaneous daily    Other:  influenza   Vaccine 0.5 milliLiter(s) IntraMuscular once  insulin glargine Injectable (LANTUS) 10 Unit(s) SubCutaneous at bedtime  insulin lispro (HumaLOG) corrective regimen sliding scale   SubCutaneous three times a day before meals  methylPREDNISolone sodium succinate Injectable 40 milliGRAM(s) IV Push every 8 hours  sodium chloride 0.9%. 1000 milliLiter(s) IV Continuous <Continuous>    ALBUTerol/ipratropium for Nebulization 3 milliLiter(s) Nebulizer every 6 hours  azithromycin  IVPB 500 milliGRAM(s) IV Intermittent every 24 hours  enoxaparin Injectable 40 milliGRAM(s) SubCutaneous daily  influenza   Vaccine 0.5 milliLiter(s) IntraMuscular once  insulin glargine Injectable (LANTUS) 10 Unit(s) SubCutaneous at bedtime  insulin lispro (HumaLOG) corrective regimen sliding scale   SubCutaneous three times a day before meals  methylPREDNISolone sodium succinate Injectable 40 milliGRAM(s) IV Push every 8 hours  piperacillin/tazobactam IVPB. 3.375 Gram(s) IV Intermittent every 8 hours  sodium chloride 0.9%. 1000 milliLiter(s) IV Continuous <Continuous>    Drug Dosing Weight  Height (cm): 162.56 (2018 21:02)  Weight (kg): 67.5 (2018 21:02)  BMI (kg/m2): 25.5 (2018 21:02)  BSA (m2): 1.73 (2018 21:02)    CENTRAL LINE: [ ] YES [x ] NO  LOCATION:   DATE INSERTED:  REMOVE: [ ] YES [ ] NO  EXPLAIN:    ORTIZ: [x ] YES [ ] NO    DATE INSERTED:   REMOVE:  [ ] YES [ ] NO  EXPLAIN:    A-LINE:  [ ] YES [x ] NO  LOCATION:   DATE INSERTED:  REMOVE:  [ ] YES [ ] NO  EXPLAIN:    PMH -reviewed admission note, no change since admission    ICU Vital Signs Last 24 Hrs  T(C): 35.6 (2018 04:30), Max: 37.3 (2018 15:46)  T(F): 96 (2018 04:30), Max: 99.2 (2018 19:20)  HR: 118 (2018 06:30) (117 - 134)  BP: 103/87 (2018 06:30) (103/84 - 170/99)  BP(mean): 91 (2018 06:30) (86 - 114)  ABP: --  ABP(mean): --  RR: 14 (2018 06:30) (14 - 34)  SpO2: 96% (2018 06:30) (91% - 100%)      ABG - ( 2018 04:49 )  pH: 7.44  /  pCO2: 30    /  pO2: 113   / HCO3: 20    / Base Excess: -2.8  /  SaO2: 98                     @ 07:01  -   @ 07:00  --------------------------------------------------------  IN: 450 mL / OUT: 700 mL / NET: -250 mL            PHYSICAL EXAM:    GENERAL: patient is on BIPAP not in distress  HEAD: atraumatic, normocephalic   EYES: PERRLA, white sclera.   ENMT: nasal mucosa and Oral cavity- normal  NECK: supple,  no JVD   SKIN: warm, dry   CHEST/LUNG: No Chest deformity , no chest tenderness. bilateral breath sounds, bilateral coarse breath sounds  HEART: RRR, no m/r/g   ABDOMEN: soft, nontender, nondistended; bowel sounds.  :ortiz catheter.  EXTREMITIES: no pedal edema, no cyanosis, no clubbing.  NEURO: alert, awake, mood/ affect-normal, no focal neuro deficits        LABS:  CBC Full  -  ( 2018 04:17 )  WBC Count : 8.2 K/uL  Hemoglobin : 15.0 g/dL  Hematocrit : 45.6 %  Platelet Count - Automated : 200 K/uL  Mean Cell Volume : 99.0 fl  Mean Cell Hemoglobin : 32.5 pg  Mean Cell Hemoglobin Concentration : 32.9 gm/dL  Auto Neutrophil # : x  Auto Lymphocyte # : x  Auto Monocyte # : x  Auto Eosinophil # : x  Auto Basophil # : x  Auto Neutrophil % : x  Auto Lymphocyte % : x  Auto Monocyte % : x  Auto Eosinophil % : x  Auto Basophil % : x        119<LL>  |  85<L>  |  27<H>  ----------------------------<  267<H>  4.6   |  22  |  1.07    Ca    8.0<L>      2018 04:17  Phos  3.2       Mg     2.0         TPro  5.6<L>  /  Alb  1.9<L>  /  TBili  0.8  /  DBili  x   /  AST  23  /  ALT  43  /  AlkPhos  65      PT/INR - ( 2018 15:37 )   PT: 14.0 sec;   INR: 1.28 ratio         PTT - ( 2018 15:37 )  PTT:24.9 sec  Urinalysis Basic - ( 2018 17:52 )    Color: Yellow / Appearance: Clear / S.015 / pH: x  Gluc: x / Ketone: Small  / Bili: Negative / Urobili: Negative   Blood: x / Protein: 100 / Nitrite: Negative   Leuk Esterase: Negative / RBC: 5-10 /HPF / WBC 0-2 /HPF   Sq Epi: x / Non Sq Epi: Few /HPF / Bacteria: Few /HPF          RADIOLOGY & ADDITIONAL STUDIES REVIEWED:     [ ]GOALS OF CARE DISCUSSION WITH PATIENT/FAMILY/PROXY:    CRITICAL CARE TIME SPENT: 35 minutes

## 2018-01-29 NOTE — PROGRESS NOTE ADULT - PROBLEM SELECTOR PLAN 1
Likely 2/2 COPD exacerbation due to multifocal pneumonia  CXR shows Right lower lobe consolidation.  Placed on BiPAP  Given zosyn + Levaquin  Lactate 5.5 s/p 1.5 L bolus  NPO for now  f/u BCx and RVP Likely 2/2 COPD exacerbation due to multifocal pneumonia  CXR shows Right lower lobe consolidation.  Placed on BiPAP overnight  Given zosyn + zithromycin  Lactate 5.5 s/p 1.5 L bolus trended down to 3.2 likely secondary to use of albuterol nebs  NPO for now  RVP- negative  f/u urine for legionella, strep pneumonia  f/u BCx

## 2018-01-29 NOTE — PROGRESS NOTE ADULT - ASSESSMENT
82 F from home lives with son, ambulates with personal assistance, PMH of COPD, right side lung Ca stage 3b brought in for severe respiratory distress. 82 F from home lives with son, ambulates with personal assistance, PMH of COPD, right side lung Ca stage 3b brought in for severe respiratory distress secondary to COPD exacerbation secondary to RT lower lobe PNA.

## 2018-01-29 NOTE — CONSULT NOTE ADULT - SUBJECTIVE AND OBJECTIVE BOX
HPI:  82 F from home lives with son, ambulates with personal assistance, PMH of right side lung Ca stage 3b S/P chemoradiation therapy since last year, COPD, DM, brought in for SOB for few days. Dyspnea is getting worse and today patient was in severe respiratory distress. Dyspnea is associated with weakness, confusion, cough and decreased appetite. He is confused for 1 day. Cough is productive with scant sputum. Patient was smoker in past.     As per son patient had no fever, chest pain, rash, abdominal pain, diarrhea, nausea, vomiting, current smoking, alcohol abuse and illicit drug use.    Vital Signs Last 24 Hrs  T(C): 37.3 (2018 15:46), Max: 37.3 (2018 15:46)  T(F): 99.1 (2018 15:46), Max: 99.1 (2018 15:46)  HR: 127 (2018 15:46) (123 - 130)  BP: 144/90 (2018 15:46) (144/90 - 152/107)  RR: 28 (2018 15:46) (26 - 28)  SpO2: 92% (2018 15:46) (91% - 96%) (2018 18:10)      PAST MEDICAL & SURGICAL HISTORY:  Lung cancer      No Known Allergies      ALBUTerol/ipratropium for Nebulization 3 milliLiter(s) Nebulizer every 6 hours  azithromycin  IVPB 500 milliGRAM(s) IV Intermittent every 24 hours  chlorhexidine 4% Liquid 1 Application(s) Topical daily  enoxaparin Injectable 40 milliGRAM(s) SubCutaneous daily  influenza   Vaccine 0.5 milliLiter(s) IntraMuscular once  insulin glargine Injectable (LANTUS) 10 Unit(s) SubCutaneous at bedtime  insulin lispro (HumaLOG) corrective regimen sliding scale   SubCutaneous three times a day before meals  methylPREDNISolone sodium succinate Injectable 40 milliGRAM(s) IV Push every 8 hours  piperacillin/tazobactam IVPB. 3.375 Gram(s) IV Intermittent every 8 hours  sodium chloride 0.9%. 1000 milliLiter(s) IV Continuous <Continuous>      Social Hx:    FAMILY HISTORY:        ROS  [  ] UNABLE TO ELICIT    General:  [  ] None  [ x ] Fever  [  ] Chills  [ x ] Malaise    Skin:  [  ] None [  ] Rash  [  ] Wound  [  ] Ulcer    HEENT:  [ x ] None  [  ] Sore Throat  [  ] Nasal congestion/ runny nose  [  ] Photophobia [  ] Neck pain      Chest:  [  ] None   [ x ] SOB  [ x ] Cough  [  ] None    Cardiovascular:   [ x ] None  [  ] CP  [  ] Palpitation    Gastrointestinal:  [ x ] None  [  ] Abd pain   [  ] Nausea    [  ] Vomiting   [  ] Diarrhea	     Genitourinary:  [ x ] None [  ] Polyuria   [  ] Urgency  [  ] Frequency  [  ] Dysuria    [  ]  Hematuria       Musculoskeletal:  [  ] None [  ] Back Pain	[  ] Body aches  [  ] Joint pain  [ x ] Weakness    Neurological: [  ] None [  ]Dizziness  [  ]Visual Disturbance  [  ]Headaches   [ x ] Weakness      PHYSICAL EXAM:    Vital Signs Last 24 Hrs  T(C): 36.4 (2018 08:00), Max: 37.3 (2018 19:20)  T(F): 97.5 (2018 08:00), Max: 99.2 (2018 19:20)  HR: 121 (2018 17:32) (115 - 134)  BP: 94/71 (2018 15:00) (92/72 - 170/99)  BP(mean): 75 (2018 15:00) (75 - 114)  RR: 15 (2018 15:00) (14 - 34)  SpO2: 98% (2018 17:32) (89% - 100%)    Constitutional:    HEENT: [ x ] Wnl  [  ] Pharyngeal congestion    Neck:  [ x ] Supple  [  ]Lymphadenopathy  [ x ] No JVD   [  ] JVD  [  ] Masses   [  ] WNL    CHEST/Respiratory:  [  ]Clear to auscultation  [ x ] Rales   [  ] Rhonchi   [  ] Wheezing     [  ] Chest Tenderness      Cardiovascular:  [ x ] Reg S1 S2   [  ] Irreg S1 S2   [ x ]No Murmur  [  ] +ve Murmurs  [  ]Systolic [  ]Diastolic      Abdomen:  [ x ] Soft  [ x ] No tendrerness  [  ] Tenderness  [  ] Organomegaly  [  ] ABD Distention  [  ] Rigidity                       [ x ] No Regidity                       [ x ] No Rebound Tenderness  [  ] No Guarding Rigidity  [  ] Rebound Tenderness[  ] Guarding Rigidity                          [ x ]  +ve Bowel Sounds  [  ] Decreased Bowel Sounds    [  ] Absent Bowel Sounds                            Extremities: [ x ] No edema [  ] Edema  [  ] Clubbing   [  ] Cyanosis                         [ x ] No Tender Calf muscles  [  ] Tender Calf muscles                        [ x ] Palpable peripheral pulses    Neurological: [ x ] Awake  [ x ] Alert  [ x ] Oriented  x 2                            [  ] Confused  [  ] Drowzy  [  ] repond to painful stimuli  [  ] Unresponsive    Skin:  [ x ] Intact [  ] Redness [  ] Thrombophlebitis  [  ] Rashes  [  ] Dry  [  ] Ulcers    Ortho:  [  ] Joint Swelling  [  ] Joint erythema [  ] Joint tenderness                [  ] Increased temp. to touch  [  ] DJD [ x ] WNL      LABS/DIAGNOSTIC TESTS                          15.0   8.2   )-----------( 200      ( 2018 04:17 )             45.6     WBC Count: 8.2 K/uL ( @ 04:17)  WBC Count: 10.6 K/uL ( @ 15:37)          125<L>  |  91<L>  |  27<H>  ----------------------------<  141<H>  4.2   |  24  |  1.01    Ca    7.9<L>      2018 16:59  Phos  3.2       Mg     2.0         TPro  5.6<L>  /  Alb  1.9<L>  /  TBili  0.8  /  DBili  x   /  AST  23  /  ALT  43  /  AlkPhos  65        Urinalysis Basic - ( 2018 17:52 )    Color: Yellow / Appearance: Clear / S.015 / pH: x  Gluc: x / Ketone: Small  / Bili: Negative / Urobili: Negative   Blood: x / Protein: 100 / Nitrite: Negative   Leuk Esterase: Negative / RBC: 5-10 /HPF / WBC 0-2 /HPF   Sq Epi: x / Non Sq Epi: Few /HPF / Bacteria: Few /HPF        LIVER FUNCTIONS - ( 2018 04:17 )  Alb: 1.9 g/dL / Pro: 5.6 g/dL / ALK PHOS: 65 U/L / ALT: 43 U/L DA / AST: 23 U/L / GGT: x             PT/INR - ( 2018 15:37 )   PT: 14.0 sec;   INR: 1.28 ratio         PTT - ( 2018 15:37 )  PTT:24.9 sec    LACTATE:Lactate, Blood: 3.2 mmol/L ( @ 04:15)  Lactate, Blood: 3.4 mmol/L ( @ 01:01)  Lactate, Blood: 3.5 mmol/L ( @ 21:36)  Lactate, Blood: 4.3 mmol/L ( @ 18:24)        CULTURES:   Pending.    RADIOLOGY    CXR:    EXAM:  XR CHEST PORTABLE ROUTINE 1V                            PROCEDURE DATE:  2018          INTERPRETATION:  INDICATION: Shortness of breath    PRIORS: 2018    VIEWS: Portable AP radiography of the chest performed.    FINDINGS: Heart size appears within normal limits. No superior   mediastinal widening is identified. Hilar morphology is unchanged. There   is no significant interval change in bilateral lung parenchymal   interstitial opacities and no change in coexisting airspace opacities   within the right mid to lower lung fields. Small pleural effusions cannot   be excluded. There is no evidence for pneumothorax. No mediastinal shift   is noted. Arterial deficiency is identified within the upper lung fields   suggesting advanced COPD/emphysema.    IMPRESSION: No significant interval change from prior 2018.

## 2018-01-30 NOTE — CONSULT NOTE ADULT - SUBJECTIVE AND OBJECTIVE BOX
82 F from home lives with son, ambulates with personal assistance, PMH of right side lung Ca stage 3b S/P chemoradiation therapy since last year, COPD, DM, brought in for SOB for few days. Dyspnea is getting worse and today patient was in severe respiratory distress. Dyspnea is associated with weakness, confusion, cough and decreased appetite. He is confused for 1 day. Cough is productive with scant sputum. Patient was smoker in past.     Vital Signs Last 24 Hrs  T(C): 37.3 (2018 15:46), Max: 37.3 (2018 15:46)  T(F): 99.1 (2018 15:46), Max: 99.1 (2018 15:46)  HR: 127 (2018 15:46) (123 - 130)  BP: 144/90 (2018 15:46) (144/90 - 152/107)  RR: 28 (2018 15:46) (26 - 28)  SpO2: 92% (2018 15:46) (91% - 96%) (2018 18:10)    PAST MEDICAL & SURGICAL HISTORY:  Lung cancer    SOCIAL HISTORY:    Admitted from:  home, lives with wife and aide   Substance abuse history:              Tobacco hx: Former smoker                  Alcohol hx:              Home Opioid hx: None  Judaism: Unknown                                    Preferred Language: Mandarin    Surrogate/HCP/Guardian:  Homer Vyastristan Russ (Spouse) [phone]          FAMILY HISTORY: Unknown    Baseline ADLs (prior to admission): Requires assistance with ADLs    Allergies:  No Known Allergies    Present Symptoms:   Dyspnea:   Nausea/Vomiting:   Anxiety:  Depressed   Fatigue:  Loss of appetite:   Pain:                                location:          Review of Systems: [All others negative or Unable to obtain due to poor mentation]    MEDICATIONS  (STANDING):  ALBUTerol/ipratropium for Nebulization 3 milliLiter(s) Nebulizer every 6 hours  azithromycin  IVPB 500 milliGRAM(s) IV Intermittent every 24 hours  chlorhexidine 4% Liquid 1 Application(s) Topical daily  enoxaparin Injectable 40 milliGRAM(s) SubCutaneous daily  influenza   Vaccine 0.5 milliLiter(s) IntraMuscular once  insulin glargine Injectable (LANTUS) 10 Unit(s) SubCutaneous at bedtime  insulin lispro (HumaLOG) corrective regimen sliding scale   SubCutaneous three times a day before meals  methylPREDNISolone sodium succinate Injectable 40 milliGRAM(s) IV Push every 8 hours  piperacillin/tazobactam IVPB. 3.375 Gram(s) IV Intermittent every 8 hours    PHYSICAL EXAM:    Vital Signs Last 24 Hrs  T(C): 35.6 (2018 07:00), Max: 36.1 (2018 15:18)  T(F): 96 (2018 07:00), Max: 97 (2018 15:18)  HR: 124 (2018 12:20) (114 - 125)  BP: 115/82 (2018 12:20) (89/73 - 121/84)  BP(mean): 90 (2018 12:20) (74 - 93)  RR: 19 (2018 12:20) (13 - 29)  SpO2: 94% (2018 12:20) (86% - 100%)    General: alert  oriented x ____    [lethargic distressed cachexia  nonverbal  unarousable verbal]  Karnofsky Performance Score/Palliative Performance Status Version2:     %    HEENT: normal  dry mouth  ET tube/trach oral lesions:  Lungs: comfortable tachypnea/labored breathing  excessive secretions  CV: normal  tachycardia  GI: normal  distended  tender  incontinent               PEG/NG/OG tube  constipation  last BM:   : normal  incontinent  oliguria/anuria  ortiz  Musculoskeletal: normal  weakness  edema             ambulatory  bedbound/wheelchair bound  Skin: normal  pressure ulcers: stage: edema: other:  Neuro: no deficits cognitive impairment dsyphagia/dysarthria paresis: other:  Oral intake ability: unable/only mouth care [minimal moderate full capability]  Diet: [NPO]    LABS:                        14.4   10.0  )-----------( 196      ( 2018 04:07 )             43.0     01-30    126<L>  |  93<L>  |  29<H>  ----------------------------<  151<H>  4.2   |  22  |  0.97    Ca    7.8<L>      2018 04:07  Phos  3.2     01-30  Mg     2.1     30    TPro  5.3<L>  /  Alb  1.7<L>  /  TBili  0.6  /  DBili  x   /  AST  21  /  ALT  43  /  AlkPhos  65  01-30    Urinalysis Basic - ( 2018 17:52 )    Color: Yellow / Appearance: Clear / S.015 / pH: x  Gluc: x / Ketone: Small  / Bili: Negative / Urobili: Negative   Blood: x / Protein: 100 / Nitrite: Negative   Leuk Esterase: Negative / RBC: 5-10 /HPF / WBC 0-2 /HPF   Sq Epi: x / Non Sq Epi: Few /HPF / Bacteria: Few /HPF        RADIOLOGY & ADDITIONAL STUDIES:    ADVANCE DIRECTIVES:   Advanced Care Planning discussion total time spent: 82 F from home lives with son, ambulates with assistance, PMH of right side lung Ca stage 3b S/P chemo and radiation therapy last year, COPD, DM, brought in for SOB for few days. Admitted to ICU for respiratory failure secondary to PNA and new BIPAP. Now on nasal canula and awaiting transfer to medical floor.  Palliative consult called to address goals of care.  Per admission note, patient is DNR/DNI, as per son's request.      Vital Signs Last 24 Hrs  T(C): 37.3 (2018 15:46), Max: 37.3 (2018 15:46)  T(F): 99.1 (2018 15:46), Max: 99.1 (2018 15:46)  HR: 127 (2018 15:46) (123 - 130)  BP: 144/90 (2018 15:46) (144/90 - 152/107)  RR: 28 (2018 15:46) (26 - 28)  SpO2: 92% (2018 15:46) (91% - 96%) (2018 18:10)    PAST MEDICAL & SURGICAL HISTORY:  Lung cancer -  Oncologist Dr. Riley [phone]    SOCIAL HISTORY:    Admitted from:  home, lives with wife and aide   Substance abuse history:              Tobacco hx: Former smoker                  Alcohol hx:              Home Opioid hx: None  Voodoo: Unknown                                    Preferred Language: Mandarin    Surrogate/HCP/Guardian:  Homer Russ (Spouse) [phone]          FAMILY HISTORY: Unknown    Baseline ADLs (prior to admission): Requires assistance with ADLs    Allergies:  No Known Allergies    Present Symptoms:   Dyspnea: Denies  Nausea/Vomiting: Denies  Anxiety: Denies  Depressed: Denies   Fatigue: Moderate  Loss of appetite: Moderate  Pain: Denies                                        Review of Systems: All others negative     MEDICATIONS  (STANDING):  ALBUTerol/ipratropium for Nebulization 3 milliLiter(s) Nebulizer every 6 hours  azithromycin  IVPB 500 milliGRAM(s) IV Intermittent every 24 hours  chlorhexidine 4% Liquid 1 Application(s) Topical daily  enoxaparin Injectable 40 milliGRAM(s) SubCutaneous daily  influenza   Vaccine 0.5 milliLiter(s) IntraMuscular once  insulin glargine Injectable (LANTUS) 10 Unit(s) SubCutaneous at bedtime  insulin lispro (HumaLOG) corrective regimen sliding scale   SubCutaneous three times a day before meals  methylPREDNISolone sodium succinate Injectable 40 milliGRAM(s) IV Push every 8 hours  piperacillin/tazobactam IVPB. 3.375 Gram(s) IV Intermittent every 8 hours    PHYSICAL EXAM:    Vital Signs Last 24 Hrs  T(C): 35.6 (2018 07:00), Max: 36.1 (2018 15:18)  T(F): 96 (2018 07:00), Max: 97 (2018 15:18)  HR: 124 (2018 12:20) (114 - 125)  BP: 115/82 (2018 12:20) (89/73 - 121/84)  BP(mean): 90 (2018 12:20) (74 - 93)  RR: 19 (2018 12:20) (13 - 29)  SpO2: 94% (2018 12:20) (86% - 100%)    General: alert  oriented x _2-3___ verbal  Karnofsky Performance Score/Palliative Performance Status Version2: 30%    HEENT: normal    Lungs: comfortable   CV: Tachycardia  GI: Normal  : normal  incontinent  oliguria/anuria  ortiz  Musculoskeletal: normal  weakness  edema             ambulatory  bedbound/wheelchair bound  Skin: normal  pressure ulcers: stage: edema: other:  Neuro: no deficits cognitive impairment dsyphagia/dysarthria paresis: other:  Oral intake ability: unable/only mouth care [minimal moderate full capability]  Diet: [NPO]    LABS:                        14.4   10.0  )-----------( 196      ( 2018 04:07 )             43.0     01-30    126<L>  |  93<L>  |  29<H>  ----------------------------<  151<H>  4.2   |  22  |  0.97    Ca    7.8<L>      2018 04:07  Phos  3.2     -30  Mg     2.1     30    TPro  5.3<L>  /  Alb  1.7<L>  /  TBili  0.6  /  DBili  x   /  AST  21  /  ALT  43  /  AlkPhos  65  30    Urinalysis Basic - ( 2018 17:52 )    Color: Yellow / Appearance: Clear / S.015 / pH: x  Gluc: x / Ketone: Small  / Bili: Negative / Urobili: Negative   Blood: x / Protein: 100 / Nitrite: Negative   Leuk Esterase: Negative / RBC: 5-10 /HPF / WBC 0-2 /HPF   Sq Epi: x / Non Sq Epi: Few /HPF / Bacteria: Few /HPF        RADIOLOGY & ADDITIONAL STUDIES:    ADVANCE DIRECTIVES:   Advanced Care Planning discussion total time spent: 82 F from home lives with son, ambulates with assistance, PMH of right side lung Ca stage 3b S/P chemo and radiation therapy last year, COPD, DM, brought in for SOB for few days. Admitted to ICU for respiratory failure secondary to PNA and new BIPAP. Now on nasal canula and awaiting transfer to medical floor.  Palliative consult called to address goals of care.  Per admission note, patient is DNR/DNI, as per son's request.      Vital Signs Last 24 Hrs  T(C): 37.3 (2018 15:46), Max: 37.3 (2018 15:46)  T(F): 99.1 (2018 15:46), Max: 99.1 (2018 15:46)  HR: 127 (2018 15:46) (123 - 130)  BP: 144/90 (2018 15:46) (144/90 - 152/107)  RR: 28 (2018 15:46) (26 - 28)  SpO2: 92% (2018 15:46) (91% - 96%) (2018 18:10)    PAST MEDICAL & SURGICAL HISTORY:  Lung cancer -  Oncologist Dr. Riley [phone]    SOCIAL HISTORY:    Admitted from:  home, lives with wife and aide   Substance abuse history:              Tobacco hx: Former smoker                  Alcohol hx:              Home Opioid hx: None  Sabianism: Unknown                                    Preferred Language: Mandarin    Surrogate/HCP/Guardian:  Homer Russ (Spouse) [phone]          FAMILY HISTORY: Unknown    Baseline ADLs (prior to admission): Requires assistance with ADLs    Allergies:  No Known Allergies    Present Symptoms:   Dyspnea: Denies  Nausea/Vomiting: Denies  Anxiety: Denies  Depressed: Denies   Fatigue: Moderate  Loss of appetite: Moderate  Pain: Denies                                        Review of Systems: All others negative     MEDICATIONS  (STANDING):  ALBUTerol/ipratropium for Nebulization 3 milliLiter(s) Nebulizer every 6 hours  azithromycin  IVPB 500 milliGRAM(s) IV Intermittent every 24 hours  chlorhexidine 4% Liquid 1 Application(s) Topical daily  enoxaparin Injectable 40 milliGRAM(s) SubCutaneous daily  influenza   Vaccine 0.5 milliLiter(s) IntraMuscular once  insulin glargine Injectable (LANTUS) 10 Unit(s) SubCutaneous at bedtime  insulin lispro (HumaLOG) corrective regimen sliding scale   SubCutaneous three times a day before meals  methylPREDNISolone sodium succinate Injectable 40 milliGRAM(s) IV Push every 8 hours  piperacillin/tazobactam IVPB. 3.375 Gram(s) IV Intermittent every 8 hours    PHYSICAL EXAM:    Vital Signs Last 24 Hrs  T(C): 35.6 (2018 07:00), Max: 36.1 (2018 15:18)  T(F): 96 (2018 07:00), Max: 97 (2018 15:18)  HR: 124 (2018 12:20) (114 - 125)  BP: 115/82 (2018 12:20) (89/73 - 121/84)  BP(mean): 90 (2018 12:20) (74 - 93)  RR: 19 (2018 12:20) (13 - 29)  SpO2: 94% (2018 12:20) (86% - 100%)    General: alert  oriented x _2-3___ verbal  Karnofsky Performance Score/Palliative Performance Status Version2: 30%    HEENT: normal    Lungs: comfortable   CV: Tachycardia  GI: Normal  :  ortiz  Musculoskeletal: Weakness, Bedbound     Skin: normal  Neuro: no deficits  Oral intake ability: Minimal  Diet: Dysphagia    LABS:                        14.4   10.0  )-----------( 196      ( 2018 04:07 )             43.0     01-30    126<L>  |  93<L>  |  29<H>  ----------------------------<  151<H>  4.2   |  22  |  0.97    Ca    7.8<L>      2018 04:07  Phos  3.2     01-30  Mg     2.1     -30    TPro  5.3<L>  /  Alb  1.7<L>  /  TBili  0.6  /  DBili  x   /  AST  21  /  ALT  43  /  AlkPhos  65  01-30    Urinalysis Basic - ( 2018 17:52 )    Color: Yellow / Appearance: Clear / S.015 / pH: x  Gluc: x / Ketone: Small  / Bili: Negative / Urobili: Negative   Blood: x / Protein: 100 / Nitrite: Negative   Leuk Esterase: Negative / RBC: 5-10 /HPF / WBC 0-2 /HPF   Sq Epi: x / Non Sq Epi: Few /HPF / Bacteria: Few /HPF    RADIOLOGY & ADDITIONAL STUDIES: < from: Xray Chest 1 View- PORTABLE-Routine (18 @ 11:00) >    IMPRESSION: Increasing findings particularly at left base.    < end of copied text >    ADVANCE DIRECTIVES:  DNR/DNI

## 2018-01-30 NOTE — PROGRESS NOTE ADULT - SUBJECTIVE AND OBJECTIVE BOX
INTERVAL /OVERNIGHT EVENTS:     PRESSORS: [ ] YES [ x] NO  WHICH:    ANTIBIOTICS:   zosyn               DATE STARTED:  ANTIBIOTICS:        zithromax          DATE STARTED:  ANTIBIOTICS:                  DATE STARTED:    Antimicrobial:  azithromycin  IVPB 500 milliGRAM(s) IV Intermittent every 24 hours  piperacillin/tazobactam IVPB. 3.375 Gram(s) IV Intermittent every 8 hours    Cardiovascular:    Pulmonary:  ALBUTerol/ipratropium for Nebulization 3 milliLiter(s) Nebulizer every 6 hours    Hematalogic:  enoxaparin Injectable 40 milliGRAM(s) SubCutaneous daily    Other:  chlorhexidine 4% Liquid 1 Application(s) Topical daily  influenza   Vaccine 0.5 milliLiter(s) IntraMuscular once  insulin glargine Injectable (LANTUS) 10 Unit(s) SubCutaneous at bedtime  insulin lispro (HumaLOG) corrective regimen sliding scale   SubCutaneous three times a day before meals  methylPREDNISolone sodium succinate Injectable 40 milliGRAM(s) IV Push every 8 hours  sodium chloride 0.9%. 1000 milliLiter(s) IV Continuous <Continuous>    ALBUTerol/ipratropium for Nebulization 3 milliLiter(s) Nebulizer every 6 hours  azithromycin  IVPB 500 milliGRAM(s) IV Intermittent every 24 hours  chlorhexidine 4% Liquid 1 Application(s) Topical daily  enoxaparin Injectable 40 milliGRAM(s) SubCutaneous daily  influenza   Vaccine 0.5 milliLiter(s) IntraMuscular once  insulin glargine Injectable (LANTUS) 10 Unit(s) SubCutaneous at bedtime  insulin lispro (HumaLOG) corrective regimen sliding scale   SubCutaneous three times a day before meals  methylPREDNISolone sodium succinate Injectable 40 milliGRAM(s) IV Push every 8 hours  piperacillin/tazobactam IVPB. 3.375 Gram(s) IV Intermittent every 8 hours  sodium chloride 0.9%. 1000 milliLiter(s) IV Continuous <Continuous>    Drug Dosing Weight  Height (cm): 162.56 (2018 21:02)  Weight (kg): 67.5 (2018 21:02)  BMI (kg/m2): 25.5 (2018 21:02)  BSA (m2): 1.73 (2018 21:02)    CENTRAL LINE: [ ] YES [x ] NO  LOCATION:   DATE INSERTED:  REMOVE: [ ] YES [ ] NO  EXPLAIN:    ORTIZ: [x ] YES [ ] NO    DATE INSERTED:   REMOVE:  [ ] YES [ ] NO  EXPLAIN:    A-LINE:  [ ] YES [x ] NO  LOCATION:   DATE INSERTED:  REMOVE:  [ ] YES [ ] NO  EXPLAIN:    PMH -reviewed admission note, no change since admission    ICU Vital Signs Last 24 Hrs  T(C): 35.6 (2018 00:00), Max: 36.4 (2018 08:00)  T(F): 96 (2018 00:00), Max: 97.5 (2018 08:00)  HR: 122 (2018 06:30) (114 - 123)  BP: 108/82 (2018 06:30) (89/73 - 115/83)  BP(mean): 88 (2018 06:30) (74 - 91)  ABP: --  ABP(mean): --  RR: 15 (2018 06:30) (13 - 29)  SpO2: 98% (2018 06:30) (87% - 100%)      ABG - ( 2018 04:18 )  pH: 7.46  /  pCO2: 30    /  pO2: 109   / HCO3: 20    / Base Excess: -1.8  /  SaO2: 98                     @ 07:01  -   @ 07:00  --------------------------------------------------------  IN: 450 mL / OUT: 700 mL / NET: -250 mL            PHYSICAL EXAM:    GENERAL: patient is on BIPAP not in distress  HEAD: atraumatic, normocephalic   EYES: PERRLA, white sclera.   ENMT: nasal mucosa and Oral cavity- normal  NECK: supple,  no JVD   SKIN: warm, dry   CHEST/LUNG: No Chest deformity , no chest tenderness. bilateral breath sounds, bilateral coarse breath sounds  HEART: RRR, no m/r/g   ABDOMEN: soft, nontender, nondistended; bowel sounds.  :ortiz catheter.  EXTREMITIES: no pedal edema, no cyanosis, no clubbing.  NEURO: alert, awake, mood/ affect-normal, no focal neuro deficits        LABS:  CBC Full  -  ( 2018 04:07 )  WBC Count : 10.0 K/uL  Hemoglobin : 14.4 g/dL  Hematocrit : 43.0 %  Platelet Count - Automated : 196 K/uL  Mean Cell Volume : 97.5 fl  Mean Cell Hemoglobin : 32.7 pg  Mean Cell Hemoglobin Concentration : 33.5 gm/dL  Auto Neutrophil # : 9.2 K/uL  Auto Lymphocyte # : 0.5 K/uL  Auto Monocyte # : 0.3 K/uL  Auto Eosinophil # : 0.0 K/uL  Auto Basophil # : 0.0 K/uL  Auto Neutrophil % : 92.0 %  Auto Lymphocyte % : 4.5 %  Auto Monocyte % : 3.3 %  Auto Eosinophil % : 0.0 %  Auto Basophil % : 0.2 %        126<L>  |  93<L>  |  29<H>  ----------------------------<  151<H>  4.2   |  22  |  0.97    Ca    7.8<L>      2018 04:07  Phos  3.2       Mg     2.1         TPro  5.3<L>  /  Alb  1.7<L>  /  TBili  0.6  /  DBili  x   /  AST  21  /  ALT  43  /  AlkPhos  65      PT/INR - ( 2018 15:37 )   PT: 14.0 sec;   INR: 1.28 ratio         PTT - ( 2018 15:37 )  PTT:24.9 sec  Urinalysis Basic - ( 2018 17:52 )    Color: Yellow / Appearance: Clear / S.015 / pH: x  Gluc: x / Ketone: Small  / Bili: Negative / Urobili: Negative   Blood: x / Protein: 100 / Nitrite: Negative   Leuk Esterase: Negative / RBC: 5-10 /HPF / WBC 0-2 /HPF   Sq Epi: x / Non Sq Epi: Few /HPF / Bacteria: Few /HPF      Culture Results:   Growth in anaerobic bottle: Gram Positive Cocci in Clusters ( @ 22:10)  Culture Results:   No growth to date. ( @ 22:10)      RADIOLOGY & ADDITIONAL STUDIES REVIEWED:     [ ]GOALS OF CARE DISCUSSION WITH PATIENT/FAMILY/PROXY:    CRITICAL CARE TIME SPENT: 35 minutes INTERVAL /OVERNIGHT EVENTS: no events overnight, patient was on BIPAP, IVF were switched to 50cc/hr based on sodium levels    PRESSORS: [ ] YES [ x] NO  WHICH:    ANTIBIOTICS:   zosyn               DATE STARTED:  ANTIBIOTICS:        zithromax          DATE STARTED:  ANTIBIOTICS:                  DATE STARTED:    Antimicrobial:  azithromycin  IVPB 500 milliGRAM(s) IV Intermittent every 24 hours  piperacillin/tazobactam IVPB. 3.375 Gram(s) IV Intermittent every 8 hours    Cardiovascular:    Pulmonary:  ALBUTerol/ipratropium for Nebulization 3 milliLiter(s) Nebulizer every 6 hours    Hematalogic:  enoxaparin Injectable 40 milliGRAM(s) SubCutaneous daily    Other:  chlorhexidine 4% Liquid 1 Application(s) Topical daily  influenza   Vaccine 0.5 milliLiter(s) IntraMuscular once  insulin glargine Injectable (LANTUS) 10 Unit(s) SubCutaneous at bedtime  insulin lispro (HumaLOG) corrective regimen sliding scale   SubCutaneous three times a day before meals  methylPREDNISolone sodium succinate Injectable 40 milliGRAM(s) IV Push every 8 hours  sodium chloride 0.9%. 1000 milliLiter(s) IV Continuous <Continuous>    ALBUTerol/ipratropium for Nebulization 3 milliLiter(s) Nebulizer every 6 hours  azithromycin  IVPB 500 milliGRAM(s) IV Intermittent every 24 hours  chlorhexidine 4% Liquid 1 Application(s) Topical daily  enoxaparin Injectable 40 milliGRAM(s) SubCutaneous daily  influenza   Vaccine 0.5 milliLiter(s) IntraMuscular once  insulin glargine Injectable (LANTUS) 10 Unit(s) SubCutaneous at bedtime  insulin lispro (HumaLOG) corrective regimen sliding scale   SubCutaneous three times a day before meals  methylPREDNISolone sodium succinate Injectable 40 milliGRAM(s) IV Push every 8 hours  piperacillin/tazobactam IVPB. 3.375 Gram(s) IV Intermittent every 8 hours  sodium chloride 0.9%. 1000 milliLiter(s) IV Continuous <Continuous>    Drug Dosing Weight  Height (cm): 162.56 (2018 21:02)  Weight (kg): 67.5 (2018 21:02)  BMI (kg/m2): 25.5 (2018 21:02)  BSA (m2): 1.73 (2018 21:02)    CENTRAL LINE: [ ] YES [x ] NO  LOCATION:   DATE INSERTED:  REMOVE: [ ] YES [ ] NO  EXPLAIN:    ORTIZ: [x ] YES [ ] NO    DATE INSERTED:   REMOVE:  [ ] YES [ ] NO  EXPLAIN:    A-LINE:  [ ] YES [x ] NO  LOCATION:   DATE INSERTED:  REMOVE:  [ ] YES [ ] NO  EXPLAIN:    PMH -reviewed admission note, no change since admission    ICU Vital Signs Last 24 Hrs  T(C): 35.6 (2018 00:00), Max: 36.4 (2018 08:00)  T(F): 96 (2018 00:00), Max: 97.5 (2018 08:00)  HR: 122 (2018 06:30) (114 - 123)  BP: 108/82 (2018 06:30) (89/73 - 115/83)  BP(mean): 88 (2018 06:30) (74 - 91)  ABP: --  ABP(mean): --  RR: 15 (2018 06:30) (13 - 29)  SpO2: 98% (2018 06:30) (87% - 100%)      ABG - ( 2018 04:18 )  pH: 7.46  /  pCO2: 30    /  pO2: 109   / HCO3: 20    / Base Excess: -1.8  /  SaO2: 98                     @ 07:01  -   @ 07:00  --------------------------------------------------------  IN: 450 mL / OUT: 700 mL / NET: -250 mL            PHYSICAL EXAM:    GENERAL: patient is on nasal cannula, not in distress  HEAD: atraumatic, normocephalic   EYES: PERRLA, white sclera.   ENMT: nasal mucosa and Oral cavity- normal  NECK: supple,  no JVD   SKIN: warm, dry   CHEST/LUNG: No Chest deformity , no chest tenderness. bilateral breath sounds, bilateral coarse breath sounds  HEART: RRR, no m/r/g   ABDOMEN: soft, nontender, nondistended; bowel sounds.  :ortiz catheter.  EXTREMITIES: no pedal edema, no cyanosis, no clubbing.  NEURO: alert, awake, mood/ affect-normal, no focal neuro deficits        LABS:  CBC Full  -  ( 2018 04:07 )  WBC Count : 10.0 K/uL  Hemoglobin : 14.4 g/dL  Hematocrit : 43.0 %  Platelet Count - Automated : 196 K/uL  Mean Cell Volume : 97.5 fl  Mean Cell Hemoglobin : 32.7 pg  Mean Cell Hemoglobin Concentration : 33.5 gm/dL  Auto Neutrophil # : 9.2 K/uL  Auto Lymphocyte # : 0.5 K/uL  Auto Monocyte # : 0.3 K/uL  Auto Eosinophil # : 0.0 K/uL  Auto Basophil # : 0.0 K/uL  Auto Neutrophil % : 92.0 %  Auto Lymphocyte % : 4.5 %  Auto Monocyte % : 3.3 %  Auto Eosinophil % : 0.0 %  Auto Basophil % : 0.2 %        126<L>  |  93<L>  |  29<H>  ----------------------------<  151<H>  4.2   |  22  |  0.97    Ca    7.8<L>      2018 04:07  Phos  3.2       Mg     2.1         TPro  5.3<L>  /  Alb  1.7<L>  /  TBili  0.6  /  DBili  x   /  AST  21  /  ALT  43  /  AlkPhos  65      PT/INR - ( 2018 15:37 )   PT: 14.0 sec;   INR: 1.28 ratio         PTT - ( 2018 15:37 )  PTT:24.9 sec  Urinalysis Basic - ( 2018 17:52 )    Color: Yellow / Appearance: Clear / S.015 / pH: x  Gluc: x / Ketone: Small  / Bili: Negative / Urobili: Negative   Blood: x / Protein: 100 / Nitrite: Negative   Leuk Esterase: Negative / RBC: 5-10 /HPF / WBC 0-2 /HPF   Sq Epi: x / Non Sq Epi: Few /HPF / Bacteria: Few /HPF      Culture Results:   Growth in anaerobic bottle: Gram Positive Cocci in Clusters ( @ 22:10)  Culture Results:   No growth to date. ( @ 22:10)      RADIOLOGY & ADDITIONAL STUDIES REVIEWED: < from: Xray Chest 1 View AP -PORTABLE-Routine (01.29.18 @ 07:51) >        INTERPRETATION:  INDICATION: Shortness of breath    PRIORS: 2018    VIEWS: Portable AP radiography of the chest performed.    FINDINGS: Heart size appears within normal limits. No superior   mediastinal widening is identified. Hilar morphology is unchanged. There   is no significant interval change in bilateral lung parenchymal   interstitial opacities and no change in coexisting airspace opacities   within the right mid to lower lung fields. Small pleural effusions cannot   be excluded. There is no evidence for pneumothorax. No mediastinal shift   is noted. Arterial deficiency is identified within the upper lung fields   suggesting advanced COPD/emphysema.    IMPRESSION: No significant interval change from prior 2018.    < end of copied text >      [x ]GOALS OF CARE DISCUSSION WITH PATIENT/FAMILY/PROXY: DNR/DNI    CRITICAL CARE TIME SPENT: 35 minutes INTERVAL /OVERNIGHT EVENTS: no events overnight, patient was on BIPAP, IVF were lowered to 50cc/hr based on sodium levels    PRESSORS: [ ] YES [ x] NO  WHICH:    ANTIBIOTICS:   zosyn               DATE STARTED:  ANTIBIOTICS:        zithromax          DATE STARTED:  ANTIBIOTICS:                  DATE STARTED:    Antimicrobial:  azithromycin  IVPB 500 milliGRAM(s) IV Intermittent every 24 hours  piperacillin/tazobactam IVPB. 3.375 Gram(s) IV Intermittent every 8 hours    Cardiovascular:    Pulmonary:  ALBUTerol/ipratropium for Nebulization 3 milliLiter(s) Nebulizer every 6 hours    Hematalogic:  enoxaparin Injectable 40 milliGRAM(s) SubCutaneous daily    Other:  chlorhexidine 4% Liquid 1 Application(s) Topical daily  influenza   Vaccine 0.5 milliLiter(s) IntraMuscular once  insulin glargine Injectable (LANTUS) 10 Unit(s) SubCutaneous at bedtime  insulin lispro (HumaLOG) corrective regimen sliding scale   SubCutaneous three times a day before meals  methylPREDNISolone sodium succinate Injectable 40 milliGRAM(s) IV Push every 8 hours  sodium chloride 0.9%. 1000 milliLiter(s) IV Continuous <Continuous>    ALBUTerol/ipratropium for Nebulization 3 milliLiter(s) Nebulizer every 6 hours  azithromycin  IVPB 500 milliGRAM(s) IV Intermittent every 24 hours  chlorhexidine 4% Liquid 1 Application(s) Topical daily  enoxaparin Injectable 40 milliGRAM(s) SubCutaneous daily  influenza   Vaccine 0.5 milliLiter(s) IntraMuscular once  insulin glargine Injectable (LANTUS) 10 Unit(s) SubCutaneous at bedtime  insulin lispro (HumaLOG) corrective regimen sliding scale   SubCutaneous three times a day before meals  methylPREDNISolone sodium succinate Injectable 40 milliGRAM(s) IV Push every 8 hours  piperacillin/tazobactam IVPB. 3.375 Gram(s) IV Intermittent every 8 hours  sodium chloride 0.9%. 1000 milliLiter(s) IV Continuous <Continuous>    Drug Dosing Weight  Height (cm): 162.56 (2018 21:02)  Weight (kg): 67.5 (2018 21:02)  BMI (kg/m2): 25.5 (2018 21:02)  BSA (m2): 1.73 (2018 21:02)    CENTRAL LINE: [ ] YES [x ] NO  LOCATION:   DATE INSERTED:  REMOVE: [ ] YES [ ] NO  EXPLAIN:    ORTIZ: [x ] YES [ ] NO    DATE INSERTED:   REMOVE:  [ ] YES [ ] NO  EXPLAIN:    A-LINE:  [ ] YES [x ] NO  LOCATION:   DATE INSERTED:  REMOVE:  [ ] YES [ ] NO  EXPLAIN:    PMH -reviewed admission note, no change since admission    ICU Vital Signs Last 24 Hrs  T(C): 35.6 (2018 00:00), Max: 36.4 (2018 08:00)  T(F): 96 (2018 00:00), Max: 97.5 (2018 08:00)  HR: 122 (2018 06:30) (114 - 123)  BP: 108/82 (2018 06:30) (89/73 - 115/83)  BP(mean): 88 (2018 06:30) (74 - 91)  ABP: --  ABP(mean): --  RR: 15 (2018 06:30) (13 - 29)  SpO2: 98% (2018 06:30) (87% - 100%)      ABG - ( 2018 04:18 )  pH: 7.46  /  pCO2: 30    /  pO2: 109   / HCO3: 20    / Base Excess: -1.8  /  SaO2: 98                     @ 07:01  -   @ 07:00  --------------------------------------------------------  IN: 450 mL / OUT: 700 mL / NET: -250 mL            PHYSICAL EXAM:    GENERAL: patient is on nasal cannula, not in distress  HEAD: atraumatic, normocephalic   EYES: PERRLA, white sclera.   ENMT: nasal mucosa and Oral cavity- normal  NECK: supple,  no JVD   SKIN: warm, dry   CHEST/LUNG: No Chest deformity , no chest tenderness. bilateral breath sounds, bilateral coarse breath sounds  HEART: RRR, no m/r/g   ABDOMEN: soft, nontender, nondistended; bowel sounds.  :ortiz catheter.  EXTREMITIES: no pedal edema, no cyanosis, no clubbing.  NEURO: alert, awake, mood/ affect-normal, no focal neuro deficits        LABS:  CBC Full  -  ( 2018 04:07 )  WBC Count : 10.0 K/uL  Hemoglobin : 14.4 g/dL  Hematocrit : 43.0 %  Platelet Count - Automated : 196 K/uL  Mean Cell Volume : 97.5 fl  Mean Cell Hemoglobin : 32.7 pg  Mean Cell Hemoglobin Concentration : 33.5 gm/dL  Auto Neutrophil # : 9.2 K/uL  Auto Lymphocyte # : 0.5 K/uL  Auto Monocyte # : 0.3 K/uL  Auto Eosinophil # : 0.0 K/uL  Auto Basophil # : 0.0 K/uL  Auto Neutrophil % : 92.0 %  Auto Lymphocyte % : 4.5 %  Auto Monocyte % : 3.3 %  Auto Eosinophil % : 0.0 %  Auto Basophil % : 0.2 %        126<L>  |  93<L>  |  29<H>  ----------------------------<  151<H>  4.2   |  22  |  0.97    Ca    7.8<L>      2018 04:07  Phos  3.2       Mg     2.1         TPro  5.3<L>  /  Alb  1.7<L>  /  TBili  0.6  /  DBili  x   /  AST  21  /  ALT  43  /  AlkPhos  65      PT/INR - ( 2018 15:37 )   PT: 14.0 sec;   INR: 1.28 ratio         PTT - ( 2018 15:37 )  PTT:24.9 sec  Urinalysis Basic - ( 2018 17:52 )    Color: Yellow / Appearance: Clear / S.015 / pH: x  Gluc: x / Ketone: Small  / Bili: Negative / Urobili: Negative   Blood: x / Protein: 100 / Nitrite: Negative   Leuk Esterase: Negative / RBC: 5-10 /HPF / WBC 0-2 /HPF   Sq Epi: x / Non Sq Epi: Few /HPF / Bacteria: Few /HPF      Culture Results:   Growth in anaerobic bottle: Gram Positive Cocci in Clusters ( @ 22:10)  Culture Results:   No growth to date. ( @ 22:10)      RADIOLOGY & ADDITIONAL STUDIES REVIEWED: < from: Xray Chest 1 View AP -PORTABLE-Routine (01.29.18 @ 07:51) >        INTERPRETATION:  INDICATION: Shortness of breath    PRIORS: 2018    VIEWS: Portable AP radiography of the chest performed.    FINDINGS: Heart size appears within normal limits. No superior   mediastinal widening is identified. Hilar morphology is unchanged. There   is no significant interval change in bilateral lung parenchymal   interstitial opacities and no change in coexisting airspace opacities   within the right mid to lower lung fields. Small pleural effusions cannot   be excluded. There is no evidence for pneumothorax. No mediastinal shift   is noted. Arterial deficiency is identified within the upper lung fields   suggesting advanced COPD/emphysema.    IMPRESSION: No significant interval change from prior 2018.    < end of copied text >      [x ]GOALS OF CARE DISCUSSION WITH PATIENT/FAMILY/PROXY: DNR/DNI    CRITICAL CARE TIME SPENT: 35 minutes

## 2018-01-30 NOTE — PHYSICAL THERAPY INITIAL EVALUATION ADULT - GENERAL OBSERVATIONS, REHAB EVAL
pt received supine, HOB elevated, NAD. +NC 5L, +cardiac monitor, +ortiz, MANDARIN speaking - services used

## 2018-01-30 NOTE — CHART NOTE - NSCHARTNOTEFT_GEN_A_CORE
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 In the ED he was tachypneic, hypoxemic and placed on bipap with good response. CXR showed consolidation and  was started on antibiotics and given 1.5L NS in view of sepsis by the ED team. Patient was found to have severe hyponatremia at 114 and repeat 119 post-IV fluid 1.5L bolus in ED.  On ICU evaluation, BP stable, -130/min, RR 26 on BIPAP 15/5/Rate 24/40%, afebrile, elderly man, resting comfortably in bed on bipap, awake, alert, follows commands, moves all extremities, Lungs- bilateral air entry with bibasilar crackles, Heart- sinus tachycardia, no murmurs, abdomen- full, soft, nontender, +BS, ext- bilateral pitting pedal edema.  LABS reviewed: Mild leukocytosis, Na 114 and repeat 119, K 6.3 (slightly hemolyzed), lactate 5.5 and repeat 4.3, CXR showed bibasilar infiltrates. EKG - sinus tachycardia with PACs, no ST, T wave changes.  A/P  Acute hypoxemic respiratory failure from pneumonia, possibly post-obstructive in view of history of stage 3B NSCLC s/p chemo/RT. Also hypovolemic hyponatremia likely from poor oral intake.  - Continue bipap support for now.  -Start IV Zosyn for post-obstructive pneumonia and add azithromycin for atypical coverage.  -Send blood cultures, urine legionella antigen, Respiratory viral panel including influenza/RSV screen.  -Hold off on IV fluid for now in view of serum sodium elevation from 114 to 119 after 1.5L NS given in the ED.  -Monitor serum Na q4hrs for now and aim to correct not more than 8meq/L in the first 24hrs.  -Repeat serum potassium, initial sample hemolyzed.  -Trend Lactate until <2  -To call patient's pulmonologist and oncologist to obtain more info about patient.  -D/w patient's son at bedside and he made patient DNR/DNI.  -Will admit to ICU and downgrade to floor once serum sodium has been slowly corrected to the 120's.    82 F from home lives with son, ambulates with personal assistance, PMH of COPD, right side lung Ca stage 3b brought in for severe respiratory distress secondary to COPD exacerbation secondary to RT lower lobe PNA.     Problem/Plan - 1:  ·  Problem: Respiratory failure.  Plan: Likely 2/2 COPD exacerbation due to multifocal pneumonia  CXR shows bilateral lung parenchymal interstitial opacities and airspace opacities within the right mid to lower lung fields  on BiPAP since admission, off BIPAP since this AM, saturating between 88-95% on 4L NC, not in distress.  continue with IV zosyn and zithromax  Lactate- 3.2 likely secondary to use of albuterol nebs  ABG - ( 30 Jan 2018 04:18 )  pH: 7.46  /  pCO2: 30    /  pO2: 109   / HCO3: 20    / Base Excess: -1.8  /  SaO2: 98   NPO for now  2/4 blood cultures grew coagulase neg staph, likely contaminant, will f/u repeat blood cultures.  RVP- negative   urine for legionella- negative  f/u strep pneumonia  Patient tachycardic to 120s but asymptomatic, will continue to monitor further.  Patient is being downgraded to medical floor, with MEWS suspended, BIPAP at night.      Problem/Plan - 2:  ·  Problem: Hyponatremia.  Plan: Likely 2/2 decreased oral intake  urine lytes s/o urine osm of 409 and urine sodium of 8  114--> --> 126  monitor BMP Q8h  will d/c IV fluids.      Problem/Plan - 3:  ·  Problem: Lung cancer.  Plan: s/p chemo radiotherapy   needs to follow up with outpatient heme/onc.      Problem/Plan - 4:  ·  Problem: Diabetes mellitus.  Plan: C/w HSS and lantus 10 units HS  NPO for now  accuchecks WNL.      Problem/Plan - 5:  ·  Problem: Need for prophylactic measure.  Plan: Improve score 3 on lovenox.  GI ppx with protonix  GOC- patient is DNR/ DNI. 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 In the ED he was tachypneic, hypoxemic and placed on bipap with good response. CXR showed consolidation and  was started on antibiotics and given 1.5L NS in view of sepsis by the ED team. Patient was found to have severe hyponatremia at 114 and repeat 119 post 1.5L bolus in ED. Patient     A/P  Acute hypoxemic respiratory failure from pneumonia, possibly post-obstructive in view of history of stage 3B NSCLC s/p chemo/RT. Also hypovolemic hyponatremia likely from poor oral intake.  - Continue bipap support for now.  -Start IV Zosyn for post-obstructive pneumonia and add azithromycin for atypical coverage.  -Send blood cultures, urine legionella antigen, Respiratory viral panel including influenza/RSV screen.  -Hold off on IV fluid for now in view of serum sodium elevation from 114 to 119 after 1.5L NS given in the ED.  -Monitor serum Na q4hrs for now and aim to correct not more than 8meq/L in the first 24hrs.  -Repeat serum potassium, initial sample hemolyzed.  -Trend Lactate until <2  -To call patient's pulmonologist and oncologist to obtain more info about patient.  -D/w patient's son at bedside and he made patient DNR/DNI.  -Will admit to ICU and downgrade to floor once serum sodium has been slowly corrected to the 120's.    82 F from home lives with son, ambulates with personal assistance, PMH of COPD, right side lung Ca stage 3b brought in for severe respiratory distress secondary to COPD exacerbation secondary to RT lower lobe PNA.     Problem/Plan - 1:  ·  Problem: Respiratory failure.  Plan: Likely 2/2 COPD exacerbation due to multifocal pneumonia  CXR shows bilateral lung parenchymal interstitial opacities and airspace opacities within the right mid to lower lung fields  on BiPAP since admission, off BIPAP since this AM, saturating between 88-95% on 4L NC, not in distress.  continue with IV zosyn and zithromax  Lactate- 3.2 likely secondary to use of albuterol nebs  ABG - ( 30 Jan 2018 04:18 )  pH: 7.46  /  pCO2: 30    /  pO2: 109   / HCO3: 20    / Base Excess: -1.8  /  SaO2: 98   NPO for now  2/4 blood cultures grew coagulase neg staph, likely contaminant, will f/u repeat blood cultures.  RVP- negative   urine for legionella- negative  f/u strep pneumonia  Patient tachycardic to 120s but asymptomatic, will continue to monitor further.  Patient is being downgraded to medical floor, with MEWS suspended, BIPAP at night.      Problem/Plan - 2:  ·  Problem: Hyponatremia.  Plan: Likely 2/2 decreased oral intake  urine lytes s/o urine osm of 409 and urine sodium of 8  114--> --> 126  monitor BMP Q8h  will d/c IV fluids.      Problem/Plan - 3:  ·  Problem: Lung cancer.  Plan: s/p chemo radiotherapy   needs to follow up with outpatient heme/onc.      Problem/Plan - 4:  ·  Problem: Diabetes mellitus.  Plan: C/w HSS and lantus 10 units HS  NPO for now  accuchecks WNL.      Problem/Plan - 5:  ·  Problem: Need for prophylactic measure.  Plan: Improve score 3 on lovenox.  GI ppx with protonix  GOC- patient is DNR/ DNI. 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 In the ED he was tachypneic, hypoxemic and placed on bipap with good response. CXR showed consolidation and  was started on antibiotics and given 1.5L NS in view of sepsis by the ED team. Patient was found to have severe hyponatremia at 114 and repeat 119 post 1.5L bolus in ED. Patient was admitted to ICU for Acute hypoxemic respiratory failure from pneumonia, possibly post-obstructive in view of history of stage 3B NSCLC s/p chemo/RT. Also hypovolemic hyponatremia likely from poor oral intake. Patient was started on BIPAP support and was on BIPAP for the last 2 days without a break as patient was desaturating and tachypneic without BIPAP, until today AM when he was switched to NC 4L and saturations were about 88-95%, and patient was asymptomatic. Patient was tested for RVP- negative, urine legionella negative, and strep pneumonia study was pending. Patient was started on IV Zosyn and IV Zithromax, blood cultures are negative till date. Patient's serum lactate was high,   Lactate- 3.2 likely secondary to use of albuterol nebs  ABG - ( 30 Jan 2018 04:18 )  pH: 7.46  /  pCO2: 30    /  pO2: 109   / HCO3: 20    / Base Excess: -1.8  /  SaO2: 98   NPO for now  2/4 blood cultures grew coagulase neg staph, likely contaminant, will f/u repeat blood cultures.  RVP- negative   urine for legionella- negative  f/u strep pneumonia  Patient tachycardic to 120s but asymptomatic, will continue to monitor further.  Patient is being downgraded to medical floor, with MEWS suspended, BIPAP at night.      Problem/Plan - 2:  ·  Problem: Hyponatremia.  Plan: Likely 2/2 decreased oral intake  urine lytes s/o urine osm of 409 and urine sodium of 8  114--> --> 126  monitor BMP Q8h  will d/c IV fluids.      Problem/Plan - 3:  ·  Problem: Lung cancer.  Plan: s/p chemo radiotherapy   needs to follow up with outpatient heme/onc.      Problem/Plan - 4:  ·  Problem: Diabetes mellitus.  Plan: C/w HSS and lantus 10 units HS  NPO for now  accuchecks WNL.      Problem/Plan - 5:  ·  Problem: Need for prophylactic measure.  Plan: Improve score 3 on lovenox.  GI ppx with protonix  GOC- patient is DNR/ DNI. 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 In the ED he was tachypneic, hypoxemic and placed on bipap with good response. CXR showed consolidation and  was started on antibiotics and given 1.5L NS in view of sepsis by the ED team. Patient was found to have severe hyponatremia at 114 and repeat 119 post 1.5L bolus in ED. Patient was admitted to ICU for Acute hypoxemic respiratory failure from pneumonia, possibly post-obstructive in view of history of stage 3B NSCLC s/p chemo/RT. Also hypovolemic hyponatremia likely from poor oral intake. Patient was started on BIPAP support and was on BIPAP for the last 2 days without a break as patient was desaturating and tachypneic without BIPAP, until today AM when he was switched to NC 4L and saturations were about 88-95%, and patient was asymptomatic. Patient was tested for RVP- negative, urine legionella negative, and strep pneumonia study was pending. Patient was started on IV Zosyn and IV Zithromax, blood cultures are negative till date. Patient's serum lactate was high, which was likely due to use of albuterol. Initially 2/4 blood cultures grew coagulase neg staph, likely contaminant,  f/u repeat blood cultures. Patient tachycardic to 120s but asymptomatic, will continue to monitor further. Patient's diabetes was under control with lantus 10 units at bedtime and HSS, was started on mechanical soft consistent carbohydrate diet. Patient's Improve score 3, DVT prophlaxis  on lovenox. GI ppx with protonix  GOC- patient is DNR/ DNI. MEWS suspended. pateint is being downgraded to AI.    Things to follow:   TOV ( ortiz was discontinued today evening)  BMP at 6Pm  BIPAP at night.      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 In the ED he was tachypneic, hypoxemic and placed on bipap with good response. CXR showed consolidation and  was started on antibiotics and given 1.5L NS in view of sepsis by the ED team. Patient was found to have severe hyponatremia at 114 and repeat 119 post 1.5L bolus in ED. Patient was admitted to ICU for Acute hypoxemic respiratory failure from pneumonia, possibly post-obstructive in view of history of stage 3B NSCLC s/p chemo/RT. Also hypovolemic hyponatremia likely from poor oral intake. Patient was started on BIPAP support and was on BIPAP for the last 2 days without a break as patient was desaturating and tachypneic without BIPAP, until today AM when he was switched to NC 4L and saturations were about 88-95%, and patient was asymptomatic. Patient was tested for RVP- negative, urine legionella negative, and strep pneumonia study was pending. Patient was started on IV Zosyn and IV Zithromax, blood cultures are negative till date. Patient's serum lactate was high, which was likely due to use of albuterol. Initially 2/4 blood cultures grew coagulase neg staph, likely contaminant,  f/u repeat blood cultures. Patient tachycardic to 120s but asymptomatic, will continue to monitor further. Patient's diabetes was under control with lantus 10 units at bedtime and HSS, was started on mechanical soft consistent carbohydrate diet. Patient's Improve score 3, DVT prophylaxis  on lovenox. GI ppx with protonix  GOC- patient is DNR/ DNI. MEWS suspended. patient is being downgraded to AI.    Things to follow:   TOV ( ortiz was discontinued today evening)  BMP at 6Pm  BIPAP at night.      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 In the ED he was tachypneic, hypoxemic and placed on bipap with good response. CXR showed consolidation and  was started on antibiotics and given 1.5L NS in view of sepsis by the ED team. Patient was found to have severe hyponatremia at 114 and repeat 119 post 1.5L bolus in ED. Patient was admitted to ICU for Acute hypoxemic respiratory failure from pneumonia, possibly post-obstructive in view of history of stage 3B NSCLC s/p chemo/RT. Also hypovolemic hyponatremia likely from poor oral intake. Patient was started on BIPAP support and was on BIPAP for the last 2 days without a break as patient was desaturating and tachypneic without BIPAP, until today AM when he was switched to NC 4L and saturations were about 88-95%, and patient was asymptomatic. Patient was tested for RVP- negative, urine legionella negative, and strep pneumonia study was pending. Patient was started on IV Zosyn and IV Zithromax, blood cultures are negative till date. Patient's serum lactate was high, which was likely due to use of albuterol. Initially 2/4 blood cultures grew coagulase neg staph, likely contaminant,  f/u repeat blood cultures. Patient tachycardic to 120s but asymptomatic, will continue to monitor further. Patient's diabetes was under control with lantus 10 units at bedtime and HSS, was started on mechanical soft consistent carbohydrate diet. Patient's Improve score 3, DVT prophylaxis  on lovenox. GI ppx with protonix  GOC- patient is DNR/ DNI. MEWS suspended. patient is being downgraded to AI.    Things to follow:   TOV ( ortiz was discontinued today evening)  BMP at 6Pm  BIPAP at night.  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 In the ED he was tachypneic, hypoxemic and placed on bipap with good response. CXR showed consolidation and  was started on antibiotics and given 1.5L NS in view of sepsis by the ED team. Patient was found to have severe hyponatremia at 114 and repeat 119 post 1.5L bolus in ED. Patient was admitted to ICU for Acute hypoxemic respiratory failure from pneumonia, possibly post-obstructive in view of history of stage 3B NSCLC s/p chemo/RT. Also hypovolemic hyponatremia likely from poor oral intake. Patient was started on BIPAP support and was on BIPAP for the last 2 days without a break as patient was desaturating and tachypneic without BIPAP, until today AM when he was switched to NC 4L and saturations were about 88-95%, and patient was asymptomatic. Patient was tested for RVP- negative, urine legionella negative, and strep pneumonia study was pending. Patient was started on IV Zosyn and IV Zithromax, blood cultures are negative till date. Patient's serum lactate was high, which was likely due to use of albuterol. Initially 2/4 blood cultures grew coagulase neg staph, likely contaminant,  f/u repeat blood cultures. Patient tachycardic to 120s but asymptomatic, will continue to monitor further. Patient's diabetes was under control with lantus 10 units at bedtime and HSS, was started on mechanical soft consistent carbohydrate diet. Patient's Improve score 3, DVT prophylaxis  on lovenox. GI ppx with protonix  GOC- patient is DNR/ DNI. MEWS suspended. patient is being downgraded to AI.    Things to follow:   Trial of Void ( ortiz was discontinued today evening)  BIPAP at night.  f/u echo  OK for SpO2 to be around 88%      Attending: Dr Ralph  Special Care unit

## 2018-01-30 NOTE — PHYSICAL THERAPY INITIAL EVALUATION ADULT - CRITERIA FOR SKILLED THERAPEUTIC INTERVENTIONS
risk reduction/prevention/rehab potential/anticipated discharge recommendation/therapy frequency/anticipated equipment needs at discharge/functional limitations in following categories/predicted duration of therapy intervention/impairments found

## 2018-01-30 NOTE — PHYSICAL THERAPY INITIAL EVALUATION ADULT - IMPAIRMENTS FOUND, PT EVAL
posture/ventilation and respiration/gas exchange/gait, locomotion, and balance/aerobic capacity/endurance/muscle strength

## 2018-01-30 NOTE — PROGRESS NOTE ADULT - PROBLEM SELECTOR PLAN 2
Likely 2/2 decreased oral intake  urine lytes s/o urine osm of 409 and urine sodium of 8  s/p 1.5 L NS bolus  114-->119--> 122  goal is to keep around 122 by 330 pm today, avoid overcorrection  monitor BMP Q4h Likely 2/2 decreased oral intake  urine lytes s/o urine osm of 409 and urine sodium of 8  114--> --> 126  monitor BMP Q4h  will continue with IVF NS @50cc/hr Likely 2/2 decreased oral intake  urine lytes s/o urine osm of 409 and urine sodium of 8  114--> --> 126  monitor BMP Q8h  will d/c IV fluids.

## 2018-01-30 NOTE — CONSULT NOTE ADULT - PROBLEM SELECTOR RECOMMENDATION 3
1- Fluid and electrolytes management.  2- CBC and CMP follow up.
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

## 2018-01-30 NOTE — CONSULT NOTE ADULT - PROBLEM SELECTOR RECOMMENDATION 9
1- Continue Zosyn.  2- Continue Azithromycin.  3- O2 as needed.  4- Pulmonary management.  5- F/u CXR.
Stage 3B lung cancer, Spoke with his outpatient oncologist Dr. Riley ([phone]) and he said he has previously referred the patient to home hospice, however, the patient and family refused hospice services

## 2018-01-30 NOTE — PROGRESS NOTE ADULT - SUBJECTIVE AND OBJECTIVE BOX
Meds:  azithromycin  IVPB 500 milliGRAM(s) IV Intermittent every 24 hours  piperacillin/tazobactam IVPB. 3.375 Gram(s) IV Intermittent every 8 hours    Allergies:  Allergies    No Known Allergies    Intolerance:    ROS  [  ] UNABLE TO ELICIT    General:  [  ] None  [ x ] Fever  [  ] Chills  [ x ] Malaise    Skin:  [  ] None [  ] Rash  [  ] Wound  [  ] Ulcer    HEENT:  [ x ] None  [  ] Sore Throat  [  ] Nasal congestion/ runny nose  [  ] Photophobia [  ] Neck pain      Chest:  [  ] None   [ x ] SOB  [ x ] Cough  [  ] None    Cardiovascular:   [ x ] None  [  ] CP  [  ] Palpitation    Gastrointestinal:  [ x ] None  [  ] Abd pain   [  ] Nausea    [  ] Vomiting   [  ] Diarrhea	     Genitourinary:  [ x ] None [  ] Polyuria   [  ] Urgency  [  ] Frequency  [  ] Dysuria    [  ]  Hematuria       Musculoskeletal:  [  ] None [  ] Back Pain	[  ] Body aches  [  ] Joint pain  [ x ] Weakness    Neurological: [  ] None [  ]Dizziness  [  ]Visual Disturbance  [  ]Headaches   [ x ] Weakness      PHYSICAL EXAM:    Vital Signs Last 24 Hrs  T(C): 35.6 (30 Jan 2018 00:00), Max: 36.4 (29 Jan 2018 08:00)  T(F): 96 (30 Jan 2018 00:00), Max: 97.5 (29 Jan 2018 08:00)  HR: 121 (30 Jan 2018 05:00) (114 - 123)  BP: 108/76 (30 Jan 2018 05:00) (89/73 - 115/83)  BP(mean): 83 (30 Jan 2018 05:00) (74 - 91)  RR: 16 (30 Jan 2018 05:00) (13 - 29)  SpO2: 87% (30 Jan 2018 05:00) (87% - 100%)    Constitutional:    HEENT: [ x ] Wnl  [  ] Pharyngeal congestion    Neck:  [ x ] Supple  [  ]Lymphadenopathy  [ x ] No JVD   [  ] JVD  [  ] Masses   [  ] WNL    CHEST/Respiratory:  [  ]Clear to auscultation  [ x ] Rales   [  ] Rhonchi   [  ] Wheezing     [  ] Chest Tenderness      Cardiovascular:  [ x ] Reg S1 S2   [  ] Irreg S1 S2   [ x ]No Murmur  [  ] +ve Murmurs  [  ]Systolic [  ]Diastolic      Abdomen:  [ x ] Soft  [ x ] No tendrerness  [  ] Tenderness  [  ] Organomegaly  [  ] ABD Distention  [  ] Rigidity                       [ x ] No Regidity                       [ x ] No Rebound Tenderness  [  ] No Guarding Rigidity  [  ] Rebound Tenderness[  ] Guarding Rigidity                          [ x ]  +ve Bowel Sounds  [  ] Decreased Bowel Sounds    [  ] Absent Bowel Sounds                            Extremities: [ x ] No edema [  ] Edema  [  ] Clubbing   [  ] Cyanosis                         [ x ] No Tender Calf muscles  [  ] Tender Calf muscles                        [ x ] Palpable peripheral pulses    Neurological: [ x ] Awake  [ x ] Alert  [ x ] Oriented  x 2                            [  ] Confused  [  ] Drowzy  [  ] repond to painful stimuli  [  ] Unresponsive    Skin:  [ x ] Intact [  ] Redness [  ] Thrombophlebitis  [  ] Rashes  [  ] Dry  [  ] Ulcers    Ortho:  [  ] Joint Swelling  [  ] Joint erythema [  ] Joint tenderness                [  ] Increased temp. to touch  [  ] DJD [ x ] WNL          LABS/DIAGNOSTIC TESTS                          14.4   10.0  )-----------( 196      ( 30 Jan 2018 04:07 )             43.0         01-30    126<L>  |  93<L>  |  29<H>  ----------------------------<  151<H>  4.2   |  22  |  0.97    Ca    7.8<L>      30 Jan 2018 04:07  Phos  3.2     01-30  Mg     2.1     01-30    TPro  5.3<L>  /  Alb  1.7<L>  /  TBili  0.6  /  DBili  x   /  AST  21  /  ALT  43  /  AlkPhos  65  01-30      LIVER FUNCTIONS - ( 30 Jan 2018 04:07 )  Alb: 1.7 g/dL / Pro: 5.3 g/dL / ALK PHOS: 65 U/L / ALT: 43 U/L DA / AST: 21 U/L / GGT: x           ABG - ( 30 Jan 2018 04:18 )  pH: 7.46  /  pCO2: 30    /  pO2: 109   / HCO3: 20    / Base Excess: -1.8  /  SaO2: 98            CULTURES:   Culture - Blood (01.28.18 @ 22:10)    Specimen Source: .Blood Blood    Culture Results:   No growth to date.    Culture - Blood (01.28.18 @ 22:10)    -  Coagulase negative Staphylococcus: Detec    Gram Stain:   Growth in anaerobic bottle: Gram Positive Cocci in Clusters    Specimen Source: .Blood Blood    Organism: Blood Culture PCR    Culture Results:   Growth in anaerobic bottle: Gram Positive Cocci in Clusters    Organism Identification: Blood Culture PCR    Method Type: PCR        RADIOLOGY  CXR:  EXAM:  XR CHEST PORTABLE ROUTINE 1V                            PROCEDURE DATE:  01/29/2018          INTERPRETATION:  INDICATION: Shortness of breath    PRIORS: January 28, 2018    VIEWS: Portable AP radiography of the chest performed.    FINDINGS: Heart size appears within normal limits. No superior   mediastinal widening is identified. Hilar morphology is unchanged. There   is no significant interval change in bilateral lung parenchymal   interstitial opacities and no change in coexisting airspace opacities   within the right mid to lower lung fields. Small pleural effusions cannot   be excluded. There is no evidence for pneumothorax. No mediastinal shift   is noted. Arterial deficiency is identified within the upper lung fields   suggesting advanced COPD/emphysema.    IMPRESSION: No significant interval change from prior January 28, 2018.      Assessment and Recommendation:   82 F from home lives with son, ambulates with personal assistance, PMH of right side lung Ca stage 3b S/P chemoradiation therapy since last year, COPD, DM, brought in for SOB for few days. Dyspnea Progressed to severe respiratory distress.   Patient was admitted to ICU for pneumonia and respiratory failure and was started on BIPAP mask, and IV Zosyn and Azithromycin.  One Blood culture grew staph coag negative most likely represent contamination.  1/30/18 Sodium is low but improving.    Problem/Recommendation - 1:  Problem: Pneumonia.   Recommendation:   1- Continue Zosyn.  2- Continue Azithromycin.  3- O2 as needed.  4- Pulmonary management.  5- F/u CXR.    Problem/Recommendation - 2:  ·  Problem: Respiratory failure.    Recommendation:   1- Bronchodilators.  2- O2 as needed.  3- Pulmonary management.  4- Steroids as per primary, and pulmonary team if needed.  5- ICU monitoring.  6- Follow Blood and Urine cultures to final reports.     Problem/Recommendation - 3:  ·  Problem: Hyponatremia.    Recommendation:   1- Fluid and electrolytes management.  2- CBC and CMP follow up.     Problem/Recommendation - 4:  ·  Problem: Diabetes mellitus.    Recommendation:   1- Blood sugar monitoring and control.  2- Accu-Cheks with coverage.  3- 1800 val ADA diet.  4- Follow HB A1C.     Discussed with medical resident.

## 2018-01-30 NOTE — PROGRESS NOTE ADULT - PROBLEM SELECTOR PLAN 1
Likely 2/2 COPD exacerbation due to multifocal pneumonia  CXR shows Right lower lobe consolidation.  Placed on BiPAP overnight  Given zosyn + zithromycin  Lactate 5.5 s/p 1.5 L bolus trended down to 3.2 likely secondary to use of albuterol nebs  NPO for now  RVP- negative  f/u urine for legionella, strep pneumonia  f/u BCx Likely 2/2 COPD exacerbation due to multifocal pneumonia  CXR shows bilateral lung parenchymal interstitial opacities and airspace opacities within the right mid to lower lung fields  on BiPAP since admission  continue with IV zosyn and zithromax  Lactate- 3.2 likely secondary to use of albuterol nebs  ABG - ( 30 Jan 2018 04:18 )  pH: 7.46  /  pCO2: 30    /  pO2: 109   / HCO3: 20    / Base Excess: -1.8  /  SaO2: 98   NPO for now  2/4 blood cultures grew coagulase neg staph, likely contaminant, will f/u repeat blood cultures.  RVP- negative   urine for legionella- negative  f/u strep pneumonia Likely 2/2 COPD exacerbation due to multifocal pneumonia  CXR shows bilateral lung parenchymal interstitial opacities and airspace opacities within the right mid to lower lung fields  on BiPAP since admission, off BIPAP since this AM, saturating between 88-95% on 4L NC, not in distress.  continue with IV zosyn and zithromax  Lactate- 3.2 likely secondary to use of albuterol nebs  ABG - ( 30 Jan 2018 04:18 )  pH: 7.46  /  pCO2: 30    /  pO2: 109   / HCO3: 20    / Base Excess: -1.8  /  SaO2: 98   NPO for now  2/4 blood cultures grew coagulase neg staph, likely contaminant, will f/u repeat blood cultures.  RVP- negative   urine for legionella- negative  f/u strep pneumonia  Patient tachycardic to 120s but asymptomatic, will continue to monitor further.  Patient is being downgraded to medical floor, with MEWS suspended, BIPAP at night.

## 2018-01-30 NOTE — PROGRESS NOTE ADULT - PROBLEM SELECTOR PLAN 5
Improve score 3 on lovenox.  GI ppx with protonix Improve score 3 on lovenox.  GI ppx with protonix  GOC- patient is DNR/ DNI

## 2018-01-30 NOTE — CONSULT NOTE ADULT - PROBLEM SELECTOR RECOMMENDATION 4
Spoke with patient's wife Homer Russ over the phone using Mandarin pacific  #[phone]. His wife did not seem to have a clear understanding of her 's poor prognosis and incurable cancer.  Their son is the one who made the decision for DNR, however he is now out of [place]. His wife wants me to meet with her and a family friend tomorrow at 10 to further discuss goals of care. After speaking with oncologist Dr. Riley, home hospice would be the most appropriate discharge plan.  Will have family meeting tomorrow at 10.
1- Blood sugar monitoring and control.  2- Accu-Cheks with coverage.  3- 1800 val ADA diet.  4- Follow HB A1C.

## 2018-01-30 NOTE — CONSULT NOTE ADULT - PROBLEM SELECTOR RECOMMENDATION 2
1- Bronchodilators.  2- O2 as needed.  3- Pulmonary management.  4- Steroids as per primary, and pulmonary team if needed.  5- ICU monitoring.  6- Blood and Urine cultures.
Patient with significant weakness, oxygen dependent at home, requiring assistance with most ADLs

## 2018-01-30 NOTE — PROGRESS NOTE ADULT - ASSESSMENT
82 F from home lives with son, ambulates with personal assistance, PMH of COPD, right side lung Ca stage 3b brought in for severe respiratory distress secondary to COPD exacerbation secondary to RT lower lobe PNA.

## 2018-01-31 NOTE — PROGRESS NOTE ADULT - ASSESSMENT
82 male with stage 3B NSCLC, presented with acute respiratory failure , RLL pneumonia, hyponatremia.  Patient now off bipap, SpO2 88% on nasal cannula, patient comfortable. Transferred to SCU. DNR/DNI. Pallliative NP to meet with family today.

## 2018-01-31 NOTE — PROGRESS NOTE ADULT - PROBLEM SELECTOR PLAN 1
Likely 2/2 COPD exacerbation and multifocal PNA, c/w IV abx will change to oral upon dc home  saturating between 88-95% on 4L NC, Likely 2/2 COPD exacerbation and multifocal PNA, c/w IV abx will change to oral upon dc home; blood cultures negative to date  saturating between 88-95% on 4L NC,

## 2018-01-31 NOTE — PROGRESS NOTE ADULT - SUBJECTIVE AND OBJECTIVE BOX
OVERNIGHT EVENTS: None, Patient stable, downgraded to medical floor    Present Symptoms:   Dyspnea:   Nausea/Vomiting:   Anxiety:    Depressed Mood:   Fatigue:   Loss of appetite:   Pain:                   location:   Review of Systems: [All others negative or Unable to obtain due to poor mentation]    MEDICATIONS  (STANDING):  ALBUTerol/ipratropium for Nebulization 3 milliLiter(s) Nebulizer every 6 hours  azithromycin  IVPB 500 milliGRAM(s) IV Intermittent every 24 hours  chlorhexidine 4% Liquid 1 Application(s) Topical daily  enoxaparin Injectable 40 milliGRAM(s) SubCutaneous daily  influenza   Vaccine 0.5 milliLiter(s) IntraMuscular once  insulin glargine Injectable (LANTUS) 10 Unit(s) SubCutaneous at bedtime  insulin lispro (HumaLOG) corrective regimen sliding scale   SubCutaneous three times a day before meals  meropenem  IVPB 1000 milliGRAM(s) IV Intermittent every 8 hours  meropenem  IVPB        MEDICATIONS  (PRN):      PHYSICAL EXAM:  Vital Signs Last 24 Hrs  T(C): 36.4 (31 Jan 2018 12:52), Max: 37.1 (30 Jan 2018 20:09)  T(F): 97.6 (31 Jan 2018 12:52), Max: 98.7 (30 Jan 2018 20:09)  HR: 127 (31 Jan 2018 12:52) (122 - 127)  BP: 124/97 (31 Jan 2018 12:52) (106/81 - 124/97)  BP(mean): 93 (30 Jan 2018 15:00) (87 - 93)  RR: 20 (31 Jan 2018 12:52) (18 - 23)  SpO2: 91% (31 Jan 2018 12:52) (88% - 96%)  General: alert  oriented x ____    [lethargic distressed cachexia  nonverbal  unarousable verbal]  Karnofsky Performance Score/Palliative Performance Status Version2:    %    HEENT: normal  dry mouth  ET tube/trach oral lesions:  Lungs: comfortable tachypnea/labored breathing  excessive secretions  CV: normal  tachycardia  GI: normal  distended  tender  incontinent               PEG/NG/OG tube  constipation  last BM:   : normal  incontinent  oliguria/anuria  ortiz  Musculoskeletal: normal  weakness  edema             ambulatory  bedbound/wheelchair bound  Skin: normal  pressure ulcers: stage: edema: other:  Neuro: no deficits cognitive impairment dsyphagia/dysarthria paresis: other:  Oral intake ability: unable/only mouth care [minimal moderate full capability]  Diet: NPO,     LABS:                          16.0   10.8  )-----------( 231      ( 31 Jan 2018 06:03 )             50.2     01-31    130<L>  |  95<L>  |  36<H>  ----------------------------<  94  4.5   |  26  |  1.27    Ca    8.4      31 Jan 2018 06:03  Phos  3.3     01-31  Mg     2.4     01-31    TPro  5.3<L>  /  Alb  1.7<L>  /  TBili  0.6  /  DBili  x   /  AST  21  /  ALT  43  /  AlkPhos  65  01-30        RADIOLOGY & ADDITIONAL STUDIES:    ADVANCE DIRECTIVES:  Advanced Care Planning discussion total time spent: OVERNIGHT EVENTS: None, Patient stable, downgraded to medical floor    Present Symptoms:   Dyspnea: Denies  Nausea/Vomiting: Denies  Anxiety: Denies   Depressed Mood: Denies  Fatigue: Moderate  Loss of appetite: Denies  Pain: Denies                     Review of Systems: No other complaints    MEDICATIONS  (STANDING):  ALBUTerol/ipratropium for Nebulization 3 milliLiter(s) Nebulizer every 6 hours  azithromycin  IVPB 500 milliGRAM(s) IV Intermittent every 24 hours  chlorhexidine 4% Liquid 1 Application(s) Topical daily  enoxaparin Injectable 40 milliGRAM(s) SubCutaneous daily  influenza   Vaccine 0.5 milliLiter(s) IntraMuscular once  insulin glargine Injectable (LANTUS) 10 Unit(s) SubCutaneous at bedtime  insulin lispro (HumaLOG) corrective regimen sliding scale   SubCutaneous three times a day before meals  meropenem  IVPB 1000 milliGRAM(s) IV Intermittent every 8 hours  meropenem  IVPB        PHYSICAL EXAM:  Vital Signs Last 24 Hrs  T(C): 36.4 (31 Jan 2018 12:52), Max: 37.1 (30 Jan 2018 20:09)  T(F): 97.6 (31 Jan 2018 12:52), Max: 98.7 (30 Jan 2018 20:09)  HR: 127 (31 Jan 2018 12:52) (122 - 127)  BP: 124/97 (31 Jan 2018 12:52) (106/81 - 124/97)  BP(mean): 93 (30 Jan 2018 15:00) (87 - 93)  RR: 20 (31 Jan 2018 12:52) (18 - 23)  SpO2: 91% (31 Jan 2018 12:52) (88% - 96%)    General: alert  oriented x __3__  verbal  Karnofsky Performance Score/Palliative Performance Status Version2:   30%    HEENT: normal    Lungs: comfortable  CV: normal   GI: normal    : normal    Musculoskeletal:  weakness, mostly bedbound   Skin: normal  Neuro: no deficits   Oral intake ability: Minimal  Diet: Dysphagia    LABS:                          16.0   10.8  )-----------( 231      ( 31 Jan 2018 06:03 )             50.2     01-31    130<L>  |  95<L>  |  36<H>  ----------------------------<  94  4.5   |  26  |  1.27    Ca    8.4      31 Jan 2018 06:03  Phos  3.3     01-31  Mg     2.4     01-31    TPro  5.3<L>  /  Alb  1.7<L>  /  TBili  0.6  /  DBili  x   /  AST  21  /  ALT  43  /  AlkPhos  65  01-30    ADVANCE DIRECTIVES: DNR/DNI

## 2018-01-31 NOTE — DISCHARGE NOTE ADULT - PATIENT PORTAL LINK FT
“You can access the [employer] Patient Portal, offered by [facility], by registering with the following website: [email or web address]”

## 2018-01-31 NOTE — PROGRESS NOTE ADULT - SUBJECTIVE AND OBJECTIVE BOX
DNR [x ]   DNI  [ x ]    INTERVAL HPI/OVERNIGHT EVENTS: downgraded from ICU overnight    PRESSORS: [ ] YES [x ] NO  WHICH:    azithromycin  IVPB 500 milliGRAM(s) IV Intermittent every 24 hours  meropenem  IVPB 1000 milliGRAM(s) IV Intermittent every 8 hours  meropenem  IVPB        Pulmonary:  ALBUTerol/ipratropium for Nebulization 3 milliLiter(s) Nebulizer every 6 hours    Hematalogic:  enoxaparin Injectable 40 milliGRAM(s) SubCutaneous daily    Other:  chlorhexidine 4% Liquid 1 Application(s) Topical daily  influenza   Vaccine 0.5 milliLiter(s) IntraMuscular once  insulin glargine Injectable (LANTUS) 10 Unit(s) SubCutaneous at bedtime  insulin lispro (HumaLOG) corrective regimen sliding scale   SubCutaneous three times a day before meals    ALBUTerol/ipratropium for Nebulization 3 milliLiter(s) Nebulizer every 6 hours  azithromycin  IVPB 500 milliGRAM(s) IV Intermittent every 24 hours  chlorhexidine 4% Liquid 1 Application(s) Topical daily  enoxaparin Injectable 40 milliGRAM(s) SubCutaneous daily  influenza   Vaccine 0.5 milliLiter(s) IntraMuscular once  insulin glargine Injectable (LANTUS) 10 Unit(s) SubCutaneous at bedtime  insulin lispro (HumaLOG) corrective regimen sliding scale   SubCutaneous three times a day before meals  meropenem  IVPB 1000 milliGRAM(s) IV Intermittent every 8 hours  meropenem  IVPB        Drug Dosing Weight  Height (cm): 162.56 (28 Jan 2018 21:02)  Weight (kg): 67.5 (28 Jan 2018 21:02)  BMI (kg/m2): 25.5 (28 Jan 2018 21:02)  BSA (m2): 1.73 (28 Jan 2018 21:02)    CENTRAL LINE: [ ] YES [x ] NO  LOCATION:   DATE INSERTED:  REMOVE: [ ] YES [ ] NO  EXPLAIN:    CURIEL: [ ] YES [ x] NO    DATE INSERTED:  REMOVE:  [ ] YES [ ] NO  EXPLAIN:    A-LINE:  [ ] YES [x ] NO  LOCATION:   DATE INSERTED:  REMOVE:  [ ] YES [ ] NO  EXPLAIN:    PAST MEDICAL & SURGICAL HISTORY:  Lung cancer        ABG - ( 30 Jan 2018 04:18 )  pH: 7.46  /  pCO2: 30    /  pO2: 109   / HCO3: 20    / Base Excess: -1.8  /  SaO2: 98                    01-30 @ 07:01 - 01-31 @ 07:00  --------------------------------------------------------  IN: 112.5 mL / OUT: 175 mL / NET: -62.5 mL            PHYSICAL EXAM:    GENERAL: NAD  HEAD:  Atraumatic, Normocephalic  EYES: EOMI, PERRLA, conjunctiva and sclera clear  ENMT: No tonsillar erythema, exudates, or enlargement; Moist mucous membranes, Good dentition, No lesions  NECK: Supple, No JVD, Normal thyroid  NERVOUS SYSTEM:  Alert & Oriented X3,   CHEST/LUNG: Clear to percussion bilaterally; No rales, rhonchi, wheezing, or rubs  HEART: Regular rate and rhythm; No murmurs, rubs, or gallops  ABDOMEN: Soft, Nontender, Nondistended; Bowel sounds present  EXTREMITIES:  2+ Peripheral Pulses, No clubbing, cyanosis, or edema  LYMPH: No lymphadenopathy noted  SKIN: No rashes or lesions      LABS:  CBC Full  -  ( 31 Jan 2018 06:03 )  WBC Count : 10.8 K/uL  Hemoglobin : 16.0 g/dL  Hematocrit : 50.2 %  Platelet Count - Automated : 231 K/uL  Mean Cell Volume : 98.0 fl  Mean Cell Hemoglobin : 31.3 pg  Mean Cell Hemoglobin Concentration : 31.9 gm/dL  Auto Neutrophil # : x  Auto Lymphocyte # : x  Auto Monocyte # : x  Auto Eosinophil # : x  Auto Basophil # : x  Auto Neutrophil % : x  Auto Lymphocyte % : x  Auto Monocyte % : x  Auto Eosinophil % : x  Auto Basophil % : x    01-31    130<L>  |  95<L>  |  36<H>  ----------------------------<  94  4.5   |  26  |  1.27    Ca    8.4      31 Jan 2018 06:03  Phos  3.3     01-31  Mg     2.4     01-31    TPro  5.3<L>  /  Alb  1.7<L>  /  TBili  0.6  /  DBili  x   /  AST  21  /  ALT  43  /  AlkPhos  65  01-30              [x]  DVT Prophylaxis - subc lovenox  [x  ]  Nutrition, Brand, Rate - dysphagia 2 Mechanical soft - nectar consistency         Goal Rate         Abdominal Nutritional Status -  Malnutrition   Cachexia      Morbid Obesity BMI >/=40    RADIOLOGY & ADDITIONAL STUDIES:  ***    [  ] Goals of Care Discussion with Family/Proxy/Other           Elements of Conversation Discussed: Patient/Family understanding of current illness   Advanced Directives                                                                       Prognosis  Treatment Options  Care Aligned with patient's wishes                                             TIME SPENT: 35 minutes

## 2018-01-31 NOTE — PROGRESS NOTE ADULT - PROBLEM SELECTOR PLAN 2
Stage 3B lung cancer; family agreeable to home with hospice ([facility]) plan for tomorrow

## 2018-01-31 NOTE — DISCHARGE NOTE ADULT - DURABLE MEDICAL EQUIPMENT AGENCY
Hospital bed and Bi-pap machine at delivered/arranged at patient's home; respiratory therapist scheduled at patients home 02/01/2018 at 6PM Hospital bed and Bi-pap machine at delivered/arranged at patient's home; respiratory therapist scheduled at patients home 02/02/2018 at 7-8PM

## 2018-01-31 NOTE — PROGRESS NOTE ADULT - PROBLEM SELECTOR PLAN 4
Patient and his wife asked me to meet with their family friend Jenaro Andre ([phone]).  Spoke with her with the assistance of mandarin  #[phone]. She also helped talked to his son Jadyn Murray, who is currently in [place].  Discussed the benefit of home hospice services and they agreed to meet with [facility] hospice nurse Virgen Howard.  After meeting with Virgen Howard, the patient and family decided for home hospice services.  He is DNR/DNI.  Hospice consents were signed and patient will likely go home tomorrow.

## 2018-01-31 NOTE — DISCHARGE NOTE ADULT - PLAN OF CARE
comfort care Likely 2/2 COPD exacerbation and multifocal PNA,  saturating between 88-95% on 4L NC,   bipap qhs  FIO2 40%, IPAP 15, CPAP 5 treated with IV abx  complete course with oral abx for 5 more days HgA1C this admission. 9.5  Make sure you get your HgA1c checked every three months.  If you take oral diabetes medications, check your blood glucose two times a day.  If you take insulin, check your blood glucose before meals and at bedtime.  It's important not to skip any meals.  Keep a log of your blood glucose results and always take it with you to your doctor appointments.  Keep a list of your current medications including injectables and over the counter medications and bring this medication list with you to all your doctor appointments.  If you have not seen your ophthalmologist this year call for appointment.  Check your feet daily for redness, sores, or openings. Do not self treat. If no improvement in two days call your primary care physician for an appointment.  Low blood sugar (hypoglycemia) is a blood sugar below 70mg/dl. Check your blood sugar if you feel signs/symptoms of hypoglycemia. If your blood sugar is below 70 take 15 grams of carbohydrates (ex 4 oz of apple juice, 3-4 glucose tablets, or 4-6 oz of regular soda) wait 15 minutes and repeat blood sugar to make sure it comes up above 70.  If your blood sugar is above 70 and you are due for a meal, have a meal.  If you are not due for a meal have a snack.  This snack helps keeps your blood sugar at a safe range. Patient with significant weakness, oxygen dependent at home, requiring assistance with most ADLs. 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 DNR/DNI.   dc home with hospice. MOLST form completed

## 2018-01-31 NOTE — PROGRESS NOTE ADULT - PROBLEM SELECTOR PLAN 5
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

## 2018-01-31 NOTE — DISCHARGE NOTE ADULT - HOSPITAL COURSE
82 male with stage 3B NSCLC, presented with acute respiratory failure , RLL pneumonia, hyponatremia.  Patient now off continuous bipap, doing well on nasal cannula.  Treated with IV abx. Patient and family decided to discharge to home hospice with [facility]. DNR/DNI MOLST form completed. 82 male with stage 3B NSCLC, S/P chemo and radiation therapy last year, presented with acute respiratory failure , RLL pneumonia, hyponatremia.  Patient now off continuous bipap, doing well on nasal cannula.  Treated with IV abx. Palliative NP spoke with his outpatient oncologist Dr. Riley ([phone]) who said he previously referred the patient to home hospice, however, the patient and family refused hospice services at that time. Patient and family has now decided to accept home hospice with [facility]. DNR/DNI MOLST form completed.

## 2018-01-31 NOTE — PROGRESS NOTE ADULT - SUBJECTIVE AND OBJECTIVE BOX
Meds:  azithromycin  IVPB 500 milliGRAM(s) IV Intermittent every 24 hours  piperacillin/tazobactam IVPB. 3.375 Gram(s) IV Intermittent every 8 hours    Allergies:  Allergies    No Known Allergies    Intolerance:    ROS  [  ] UNABLE TO ELICIT    General:  [  ] None  [ x ] Fever  [  ] Chills  [ x ] Malaise    Skin:  [  ] None [  ] Rash  [  ] Wound  [  ] Ulcer    HEENT:  [ x ] None  [  ] Sore Throat  [  ] Nasal congestion/ runny nose  [  ] Photophobia [  ] Neck pain      Chest:  [  ] None   [ x ] SOB  [ x ] Cough  [  ] None    Cardiovascular:   [ x ] None  [  ] CP  [  ] Palpitation    Gastrointestinal:  [ x ] None  [  ] Abd pain   [  ] Nausea    [  ] Vomiting   [  ] Diarrhea	     Genitourinary:  [ x ] None [  ] Polyuria   [  ] Urgency  [  ] Frequency  [  ] Dysuria    [  ]  Hematuria       Musculoskeletal:  [  ] None [  ] Back Pain	[  ] Body aches  [  ] Joint pain  [ x ] Weakness    Neurological: [  ] None [  ]Dizziness  [  ]Visual Disturbance  [  ]Headaches   [ x ] Weakness      PHYSICAL EXAM:    Vital Signs Last 24 Hrs  T(C): 36.3 (31 Jan 2018 04:50), Max: 37.1 (30 Jan 2018 20:09)  T(F): 97.4 (31 Jan 2018 04:50), Max: 98.7 (30 Jan 2018 20:09)  HR: 122 (31 Jan 2018 04:50) (122 - 125)  BP: 110/88 (31 Jan 2018 04:50) (101/77 - 119/85)  BP(mean): 93 (30 Jan 2018 15:00) (82 - 93)  RR: 18 (31 Jan 2018 04:50) (18 - 23)  SpO2: 94% (31 Jan 2018 04:50) (88% - 97%)    Constitutional:    HEENT: [ x ] Wnl  [  ] Pharyngeal congestion    Neck:  [ x ] Supple  [  ]Lymphadenopathy  [ x ] No JVD   [  ] JVD  [  ] Masses   [  ] WNL    CHEST/Respiratory:  [  ]Clear to auscultation  [ x ] Rales   [  ] Rhonchi   [  ] Wheezing     [  ] Chest Tenderness      Cardiovascular:  [ x ] Reg S1 S2   [  ] Irreg S1 S2   [ x ]No Murmur  [  ] +ve Murmurs  [  ]Systolic [  ]Diastolic      Abdomen:  [ x ] Soft  [ x ] No tendrerness  [  ] Tenderness  [  ] Organomegaly  [  ] ABD Distention  [  ] Rigidity                       [ x ] No Regidity                       [ x ] No Rebound Tenderness  [  ] No Guarding Rigidity  [  ] Rebound Tenderness[  ] Guarding Rigidity                          [ x ]  +ve Bowel Sounds  [  ] Decreased Bowel Sounds    [  ] Absent Bowel Sounds                            Extremities: [ x ] No edema [  ] Edema  [  ] Clubbing   [  ] Cyanosis                         [ x ] No Tender Calf muscles  [  ] Tender Calf muscles                        [ x ] Palpable peripheral pulses    Neurological: [ x ] Awake  [ x ] Alert  [ x ] Oriented  x 2                            [  ] Confused  [  ] Drowzy  [  ] repond to painful stimuli  [  ] Unresponsive    Skin:  [ x ] Intact [  ] Redness [  ] Thrombophlebitis  [  ] Rashes  [  ] Dry  [  ] Ulcers    Ortho:  [  ] Joint Swelling  [  ] Joint erythema [  ] Joint tenderness                [  ] Increased temp. to touch  [  ] DJD [ x ] WNL          LABS/DIAGNOSTIC TESTS                        16.0   10.8  )-----------( 231      ( 31 Jan 2018 06:03 )             50.2   01-31    130<L>  |  95<L>  |  36<H>  ----------------------------<  94  4.5   |  26  |  1.27    Ca    8.4      31 Jan 2018 06:03  Phos  3.3     01-31  Mg     2.4     01-31    TPro  5.3<L>  /  Alb  1.7<L>  /  TBili  0.6  /  DBili  x   /  AST  21  /  ALT  43  /  AlkPhos  65  01-30      CULTURES:   Culture - Blood (01.28.18 @ 22:10)    Specimen Source: .Blood Blood    Culture Results:   No growth to date.    Culture - Blood (01.28.18 @ 22:10)    -  Coagulase negative Staphylococcus: Detec    Gram Stain:   Growth in anaerobic bottle: Gram Positive Cocci in Clusters    Specimen Source: .Blood Blood    Organism: Blood Culture PCR    Culture Results:   Growth in anaerobic bottle: Gram Positive Cocci in Clusters    Organism Identification: Blood Culture PCR    Method Type: PCR        RADIOLOGY  CXR:    EXAM:  XR CHEST PORTABLE ROUTINE 1V                            PROCEDURE DATE:  01/30/2018          INTERPRETATION:  AP semierect chest on January 30, 2018 at 9:59 AM.   Patient has right lower lobe consolidation, hypoosmolality, and   hyponatremia.    Heart enlargement again noted.    Right lower lobe consolidation again noted similar to January 29.    In addition there is an increasing left lower lobe infiltrate.    IMPRESSION: Increasing findings particularly at left base.        Assessment and Recommendation:   82 F from home lives with son, ambulates with personal assistance, PMH of right side lung Ca stage 3b S/P chemoradiation therapy since last year, COPD, DM, brought in for SOB for few days. Dyspnea Progressed to severe respiratory distress.   Patient was admitted to ICU for pneumonia and respiratory failure and was started on BIPAP mask, and IV Zosyn and Azithromycin.  One Blood culture grew staph coag negative most likely represent contamination.  1/30/18 Sodium is low but improving, bur CXR showed increasing left lower lobe infiltrate, and patient was transferred to regular floor.      Problem/Recommendation - 1:  Problem: Pneumonia.   Recommendation:   1- Change Zosyn and start and Continue Meropenem 1 gm IVPB q 8 houres.  2- Continue Azithromycin.  3- O2 as needed.  4- Pulmonary management and follow up.  5- F/u CXR in 2 days.    Problem/Recommendation - 2:  ·  Problem: Respiratory failure.    Recommendation:   1- Bronchodilators.  2- O2 as needed.  3- Pulmonary management.  4- Steroids as per primary, and pulmonary team if needed.  5- Urine, and sputum cultures.     Problem/Recommendation - 3:  ·  Problem: Hyponatremia.    Recommendation:   1- Fluid and electrolytes management.  2- CBC and CMP follow up.     Problem/Recommendation - 4:  ·  Problem: Diabetes mellitus.    Recommendation:   1- Blood sugar monitoring and control.  2- Accu-Cheks with coverage.  3- 1800 val ADA diet.  4- Follow HB A1C.     Discussed with medical resident.

## 2018-01-31 NOTE — DISCHARGE NOTE ADULT - MEDICATION SUMMARY - MEDICATIONS TO TAKE
I will START or STAY ON the medications listed below when I get home from the hospital:    BIPAP  -- once a day adjust settings as needed QHS  COPD/Lung Cancer  -- Indication: For COPD    metFORMIN 500 mg oral tablet  -- 1 tab(s) by mouth 2 times a day  -- Indication: For Diabetes mellitus    Advair  mcg-21 mcg/inh inhalation aerosol  -- 1 puff(s) inhaled 2 times a day  -- Indication: For COPD    Ventolin HFA 90 mcg/inh inhalation aerosol  -- 2 puff(s) inhaled 4 times a day, As Needed  -- Indication: For COPD    ProAir HFA 90 mcg/inh inhalation aerosol  -- 2 puff(s) inhaled 4 times a day, As Needed  -- Indication: For COPD    Augmentin 875 mg-125 mg oral tablet  -- 1 tab(s) by mouth every 12 hours   -- Finish all this medication unless otherwise directed by prescriber.  Take with food or milk.    -- Indication: For Pneumonia

## 2018-01-31 NOTE — DISCHARGE NOTE ADULT - SECONDARY DIAGNOSIS.
Pneumonia Diabetes mellitus Debility Severe protein-calorie malnutrition Goals of care, counseling/discussion

## 2018-01-31 NOTE — PROGRESS NOTE ADULT - PROBLEM SELECTOR PLAN 6
Family met with palliative NP and is interested in home hospice; patient is DNR/DNI.  Family chose [facility], will plan to dc home tomorrow once DME's are delivered

## 2018-01-31 NOTE — DISCHARGE NOTE ADULT - CARE PLAN
Principal Discharge DX:	Respiratory failure  Goal:	comfort care  Assessment and plan of treatment:	Likely 2/2 COPD exacerbation and multifocal PNA,  saturating between 88-95% on 4L NC,   bipap qhs  FIO2 40%, IPAP 15, CPAP 5  Secondary Diagnosis:	Pneumonia  Assessment and plan of treatment:	treated with IV abx  complete course with oral abx for 5 more days  Secondary Diagnosis:	Diabetes mellitus  Assessment and plan of treatment:	HgA1C this admission. 9.5  Make sure you get your HgA1c checked every three months.  If you take oral diabetes medications, check your blood glucose two times a day.  If you take insulin, check your blood glucose before meals and at bedtime.  It's important not to skip any meals.  Keep a log of your blood glucose results and always take it with you to your doctor appointments.  Keep a list of your current medications including injectables and over the counter medications and bring this medication list with you to all your doctor appointments.  If you have not seen your ophthalmologist this year call for appointment.  Check your feet daily for redness, sores, or openings. Do not self treat. If no improvement in two days call your primary care physician for an appointment.  Low blood sugar (hypoglycemia) is a blood sugar below 70mg/dl. Check your blood sugar if you feel signs/symptoms of hypoglycemia. If your blood sugar is below 70 take 15 grams of carbohydrates (ex 4 oz of apple juice, 3-4 glucose tablets, or 4-6 oz of regular soda) wait 15 minutes and repeat blood sugar to make sure it comes up above 70.  If your blood sugar is above 70 and you are due for a meal, have a meal.  If you are not due for a meal have a snack.  This snack helps keeps your blood sugar at a safe range.  Secondary Diagnosis:	Debility  Assessment and plan of treatment:	Patient with significant weakness, oxygen dependent at home, requiring assistance with most ADLs.  Secondary Diagnosis:	Severe protein-calorie malnutrition  Assessment and plan of treatment:	Alb 1.7; minimal PO intake; continue supportive care; severe PCM likely secondary to metastatic disease.  Secondary Diagnosis:	Goals of care, counseling/discussion  Assessment and plan of treatment:	DNR/DNI.   dc home with hospice. MOLST form completed

## 2018-01-31 NOTE — DISCHARGE NOTE ADULT - COMMUNITY RESOURCES
Home Hospice through [facility] ([phone]); RN at [facility], [place]

## 2018-01-31 NOTE — PROGRESS NOTE ADULT - PROBLEM SELECTOR PLAN 1
Stage 3B lung cancer, Spoke with his outpatient oncologist Dr. Riley ([phone]) and he said he has previously referred the patient to home hospice, however, the patient and family had refused hospice services at the time

## 2018-01-31 NOTE — PROGRESS NOTE ADULT - PROBLEM SELECTOR PLAN 2
Recommendation: Alb 1.7; minimal PO intake; continue supportive care; severe PCM likely secondary to metastatic disease.

## 2018-02-01 NOTE — PROGRESS NOTE ADULT - ATTENDING COMMENTS
82 male with stage 3B NSCLC, presented with acute respiratory failure , RLL pneumonia, hyponatremia.  Patient now off bipap, doing well on nasal cannula.   Patient and family interested in home hospice with [facility].  Plan  - abx per ID, can switch to PO on discharge  - d/c home with home hospice
82 male with stage 3B NSCLC, presented with acute respiratory failure , RLL pneumonia, hyponatremia.  Patient now off bipap, SpO2 88% on nasal cannula, patient comfortable.  Plan  - abx  - bipap  - palliative care consult  - DVT proph  can transfer to floor, dnr/dni
82 male with stage 3B NSCLC, presented with acute respiratory failure , RLL pneumonia, hyponatremia.  Patient now off bipap, doing well on nasal cannula.   Patient and family interested in home hospice with [facility], met with hospice today.  Plan  - abx - continue meropenem in hospital, switch to augmentin on discharge  - d/c steroids  - d/c home with home hospice tomorrow    dnr/dni.
82 male with stage 3B NSCLC, presented with acute respiratory failure , RLL pneumonia, hyponatremia, now on bipap.  Plan  - abx  - bipap  - gentle hydration with NS  - palliative care consult  - DVT proph  total cc time 35 min.

## 2018-02-01 NOTE — PROGRESS NOTE ADULT - SUBJECTIVE AND OBJECTIVE BOX
Meds:  azithromycin  IVPB 500 milliGRAM(s) IV Intermittent every 24 hours  Meropenem 1 gm IVPB q 8 hours.    Allergies:  Allergies    No Known Allergies    Intolerance:    ROS  [  ] UNABLE TO ELICIT    General:  [  ] None  [ x ] Fever  [  ] Chills  [ x ] Malaise    Skin:  [  ] None [  ] Rash  [  ] Wound  [  ] Ulcer    HEENT:  [ x ] None  [  ] Sore Throat  [  ] Nasal congestion/ runny nose  [  ] Photophobia [  ] Neck pain      Chest:  [  ] None   [ x ] SOB  [ x ] Cough  [  ] None    Cardiovascular:   [ x ] None  [  ] CP  [  ] Palpitation    Gastrointestinal:  [ x ] None  [  ] Abd pain   [  ] Nausea    [  ] Vomiting   [  ] Diarrhea	     Genitourinary:  [ x ] None [  ] Polyuria   [  ] Urgency  [  ] Frequency  [  ] Dysuria    [  ]  Hematuria       Musculoskeletal:  [  ] None [  ] Back Pain	[  ] Body aches  [  ] Joint pain  [ x ] Weakness    Neurological: [  ] None [  ]Dizziness  [  ]Visual Disturbance  [  ]Headaches   [ x ] Weakness      PHYSICAL EXAM:  Vital Signs Last 24 Hrs  T(C): 36.4 (31 Jan 2018 21:00), Max: 36.4 (31 Jan 2018 12:52)  T(F): 97.6 (31 Jan 2018 21:00), Max: 97.6 (31 Jan 2018 12:52)  HR: 68 (01 Feb 2018 04:55) (68 - 127)  BP: 126/77 (01 Feb 2018 04:55) (120/82 - 126/77)  BP(mean): --  RR: 18 (01 Feb 2018 04:55) (18 - 20)  SpO2: 98% (01 Feb 2018 04:55) (91% - 98%)    Constitutional:    HEENT: [ x ] Wnl  [  ] Pharyngeal congestion    Neck:  [ x ] Supple  [  ]Lymphadenopathy  [ x ] No JVD   [  ] JVD  [  ] Masses   [  ] WNL    CHEST/Respiratory:  [  ]Clear to auscultation  [ x ] Rales   [  ] Rhonchi   [  ] Wheezing     [  ] Chest Tenderness      Cardiovascular:  [ x ] Reg S1 S2   [  ] Irreg S1 S2   [ x ]No Murmur  [  ] +ve Murmurs  [  ]Systolic [  ]Diastolic      Abdomen:  [ x ] Soft  [ x ] No tendrerness  [  ] Tenderness  [  ] Organomegaly  [  ] ABD Distention  [  ] Rigidity                       [ x ] No Regidity                       [ x ] No Rebound Tenderness  [  ] No Guarding Rigidity  [  ] Rebound Tenderness[  ] Guarding Rigidity                          [ x ]  +ve Bowel Sounds  [  ] Decreased Bowel Sounds    [  ] Absent Bowel Sounds                            Extremities: [ x ] No edema [  ] Edema  [  ] Clubbing   [  ] Cyanosis                         [ x ] No Tender Calf muscles  [  ] Tender Calf muscles                        [ x ] Palpable peripheral pulses    Neurological: [ x ] Awake  [ x ] Alert  [ x ] Oriented  x 2                            [  ] Confused  [  ] Drowzy  [  ] repond to painful stimuli  [  ] Unresponsive    Skin:  [ x ] Intact [  ] Redness [  ] Thrombophlebitis  [  ] Rashes  [  ] Dry  [  ] Ulcers    Ortho:  [  ] Joint Swelling  [  ] Joint erythema [  ] Joint tenderness                [  ] Increased temp. to touch  [  ] DJD [ x ] WNL          LABS/DIAGNOSTIC TESTS                        16.0   10.8  )-----------( 231      ( 31 Jan 2018 06:03 )             50.2   01-31    130<L>  |  95<L>  |  36<H>  ----------------------------<  94  4.5   |  26  |  1.27    Ca    8.4      31 Jan 2018 06:03  Phos  3.3     01-31  Mg     2.4     01-31    TPro  5.3<L>  /  Alb  1.7<L>  /  TBili  0.6  /  DBili  x   /  AST  21  /  ALT  43  /  AlkPhos  65  01-30    CAPILLARY BLOOD GLUCOSE      POCT Blood Glucose.: 170 mg/dL (01 Feb 2018 08:01)  POCT Blood Glucose.: 52 mg/dL (01 Feb 2018 07:39)  POCT Blood Glucose.: 52 mg/dL (01 Feb 2018 07:37)  POCT Blood Glucose.: 105 mg/dL (31 Jan 2018 21:18)  POCT Blood Glucose.: 128 mg/dL (31 Jan 2018 16:27)  POCT Blood Glucose.: 125 mg/dL (31 Jan 2018 12:16)  POCT Blood Glucose.: 142 mg/dL (31 Jan 2018 11:16)      CULTURES:   Culture - Blood (01.28.18 @ 22:10)    Specimen Source: .Blood Blood    Culture Results:   No growth to date.    Culture - Blood (01.28.18 @ 22:10)    -  Coagulase negative Staphylococcus: Detec    Gram Stain:   Growth in anaerobic bottle: Gram Positive Cocci in Clusters    Specimen Source: .Blood Blood    Organism: Blood Culture PCR    Culture Results:   Growth in anaerobic bottle: Gram Positive Cocci in Clusters    Organism Identification: Blood Culture PCR    Method Type: PCR        RADIOLOGY  CXR:    EXAM:  XR CHEST PORTABLE ROUTINE 1V                            PROCEDURE DATE:  01/30/2018          INTERPRETATION:  AP semierect chest on January 30, 2018 at 9:59 AM.   Patient has right lower lobe consolidation, hypoosmolality, and   hyponatremia.    Heart enlargement again noted.    Right lower lobe consolidation again noted similar to January 29.    In addition there is an increasing left lower lobe infiltrate.    IMPRESSION: Increasing findings particularly at left base.        Assessment and Recommendation:   82 F from home lives with son, ambulates with personal assistance, PMH of right side lung Ca stage 3b S/P chemoradiation therapy since last year, COPD, DM, brought in for SOB for few days. Dyspnea Progressed to severe respiratory distress.   Patient was admitted to ICU for pneumonia and respiratory failure and was started on BIPAP mask, and IV Zosyn and Azithromycin.  One Blood culture grew staph coag negative most likely represent contamination.  1/30/18 Sodium is low but improving, bur CXR showed increasing left lower lobe infiltrate, and patient was transferred to regular floor.      Problem/Recommendation - 1:  Problem: Pneumonia.   Recommendation:   1- Continue Meropenem 1 gm IVPB q 8 houres.  2- Continue Azithromycin.  3- O2 as needed.  4- Pulmonary management and follow up.  5- F/u CXR.  6- CBC and CMP for follow up.    Problem/Recommendation - 2:  ·  Problem: Respiratory failure.    Recommendation:   1- Bronchodilators.  2- O2 as needed.  3- Pulmonary management.  4- Steroids as per primary, and pulmonary team if needed.  5- Urine, and sputum cultures.     Problem/Recommendation - 3:  ·  Problem: Hyponatremia.    Recommendation:   1- Fluid and electrolytes management.  2- CBC and CMP follow up.     Problem/Recommendation - 4:  ·  Problem: Diabetes mellitus.    Recommendation:   1- Blood sugar monitoring and control.  2- Accu-Cheks with coverage.  3- 1800 val ADA diet.  4- Follow HB A1C.     Discussed with medical resident.

## 2018-02-01 NOTE — PROGRESS NOTE ADULT - ASSESSMENT
82 male with stage 3B NSCLC, presented with acute respiratory failure , RLL pneumonia, hyponatremia.  Patient now off bipap, SpO2 88% on nasal cannula, patient comfortable. Transferred to SCU. DNR/DNI. Pallliative NP to met with family yesterday. Plan to dc home with hospice [facility]

## 2018-02-01 NOTE — PROGRESS NOTE ADULT - PROBLEM SELECTOR PLAN 1
Likely 2/2 COPD exacerbation and multifocal PNA, c/w IV abx will change to oral upon dc home; blood cultures negative to date  saturating between 88-95% on 4L NC,

## 2018-02-01 NOTE — PROGRESS NOTE ADULT - PROBLEM SELECTOR PLAN 2
will change to oral upon dc home; blood cultures negative to date  saturating between 88-95% on 4L NC,

## 2018-02-01 NOTE — PROGRESS NOTE ADULT - SUBJECTIVE AND OBJECTIVE BOX
DNR [ x]   DNI  [x  ]    INTERVAL HPI/OVERNIGHT EVENTS: No acute events overnight; awaiting home hospice delivery of DME  episode of hypoglycemia this am which improved s/p 1am d50    PRESSORS: [ ] YES [x ] NO  WHICH:    meropenem  IVPB 1000 milliGRAM(s) IV Intermittent every 8 hours  meropenem  IVPB        Pulmonary:  ALBUTerol/ipratropium for Nebulization 3 milliLiter(s) Nebulizer every 6 hours    Hematalogic:  enoxaparin Injectable 40 milliGRAM(s) SubCutaneous daily    Other:  chlorhexidine 4% Liquid 1 Application(s) Topical daily  dextrose 5%. 1000 milliLiter(s) IV Continuous <Continuous>  dextrose 50% Injectable 12.5 Gram(s) IV Push once  dextrose 50% Injectable 25 Gram(s) IV Push once  dextrose 50% Injectable 25 Gram(s) IV Push once  dextrose Gel 1 Dose(s) Oral once PRN  glucagon  Injectable 1 milliGRAM(s) IntraMuscular once PRN  influenza   Vaccine 0.5 milliLiter(s) IntraMuscular once  insulin glargine Injectable (LANTUS) 10 Unit(s) SubCutaneous at bedtime  insulin lispro (HumaLOG) corrective regimen sliding scale   SubCutaneous three times a day before meals    ALBUTerol/ipratropium for Nebulization 3 milliLiter(s) Nebulizer every 6 hours  chlorhexidine 4% Liquid 1 Application(s) Topical daily  dextrose 5%. 1000 milliLiter(s) IV Continuous <Continuous>  dextrose 50% Injectable 12.5 Gram(s) IV Push once  dextrose 50% Injectable 25 Gram(s) IV Push once  dextrose 50% Injectable 25 Gram(s) IV Push once  dextrose Gel 1 Dose(s) Oral once PRN  enoxaparin Injectable 40 milliGRAM(s) SubCutaneous daily  glucagon  Injectable 1 milliGRAM(s) IntraMuscular once PRN  influenza   Vaccine 0.5 milliLiter(s) IntraMuscular once  insulin glargine Injectable (LANTUS) 10 Unit(s) SubCutaneous at bedtime  insulin lispro (HumaLOG) corrective regimen sliding scale   SubCutaneous three times a day before meals  meropenem  IVPB 1000 milliGRAM(s) IV Intermittent every 8 hours  meropenem  IVPB        Drug Dosing Weight  Height (cm): 162.56 (28 Jan 2018 21:02)  Weight (kg): 67.5 (28 Jan 2018 21:02)  BMI (kg/m2): 25.5 (28 Jan 2018 21:02)  BSA (m2): 1.73 (28 Jan 2018 21:02)    CENTRAL LINE: [ ] YES [x ] NO  LOCATION:   DATE INSERTED:  REMOVE: [ ] YES [ ] NO  EXPLAIN:    CURIEL: [ ] YES [ x] NO    DATE INSERTED:  REMOVE:  [ ] YES [ ] NO  EXPLAIN:    A-LINE:  [ ] YES [x NO  LOCATION:   DATE INSERTED:  REMOVE:  [ ] YES [ ] NO  EXPLAIN:    PAST MEDICAL & SURGICAL HISTORY:  Lung cancer    PHYSICAL EXAM:    GENERAL: NAD appears comfortable  HEAD:  Atraumatic, Normocephalic  EYES: EOMI, PERRLA, conjunctiva and sclera clear  ENMT: No tonsillar erythema, exudates, or enlargement; Moist mucous membranes, Good dentition, No lesions  NECK: Supple, No JVD, Normal thyroid  NERVOUS SYSTEM:  Alert & Oriented X3, CHEST/LUNG: Clear to percussion bilaterally; No rales, rhonchi, wheezing, or rubs  HEART: Regular rate and rhythm; No murmurs, rubs, or gallops  ABDOMEN: Soft, Nontender, Nondistended; Bowel sounds present  EXTREMITIES:  2+ Peripheral Pulses, No clubbing, cyanosis, or edema  LYMPH: No lymphadenopathy noted  SKIN: No rashes or lesions      LABS:  CBC Full  -  ( 31 Jan 2018 06:03 )  WBC Count : 10.8 K/uL  Hemoglobin : 16.0 g/dL  Hematocrit : 50.2 %  Platelet Count - Automated : 231 K/uL  Mean Cell Volume : 98.0 fl  Mean Cell Hemoglobin : 31.3 pg  Mean Cell Hemoglobin Concentration : 31.9 gm/dL  Auto Neutrophil # : x  Auto Lymphocyte # : x  Auto Monocyte # : x  Auto Eosinophil # : x  Auto Basophil # : x  Auto Neutrophil % : x  Auto Lymphocyte % : x  Auto Monocyte % : x  Auto Eosinophil % : x  Auto Basophil % : x    01-31    130<L>  |  95<L>  |  36<H>  ----------------------------<  94  4.5   |  26  |  1.27    Ca    8.4      31 Jan 2018 06:03  Phos  3.3     01-31  Mg     2.4     01-31                [x  ]  DVT Prophylaxis - subc lovenox  [x  ]  Nutrition, Brand, Rate         Goal Rate         Abdominal Nutritional Status -  Malnutrition   Cachexia      Morbid Obesity BMI >/=40    RADIOLOGY & ADDITIONAL STUDIES:  ***    [  ] Goals of Care Discussion with Family/Proxy/Other           Elements of Conversation Discussed: Patient/Family understanding of current illness   Advanced Directives                                                                       Prognosis  Treatment Options  Care Aligned with patient's wishes                                             TIME SPENT: 35 minutes

## 2018-02-01 NOTE — PROGRESS NOTE ADULT - PROBLEM SELECTOR PROBLEM 4
Hyponatremia
Diabetes mellitus
Diabetes mellitus
Palliative care encounter
Severe protein-calorie malnutrition

## 2018-02-02 NOTE — PROGRESS NOTE ADULT - PROVIDER SPECIALTY LIST ADULT
Critical Care
Critical Care
Infectious Disease
Palliative Care
Critical Care
Critical Care

## 2018-02-02 NOTE — PROGRESS NOTE ADULT - SUBJECTIVE AND OBJECTIVE BOX
Meds:  azithromycin  IVPB 500 milliGRAM(s) IV Intermittent every 24 hours  Meropenem 1 gm IVPB q 8 hours.    Allergies:  Allergies    No Known Allergies    Intolerance:    ROS  [  ] UNABLE TO ELICIT    General:  [  ] None  [ x ] Fever  [  ] Chills  [ x ] Malaise    Skin:  [  ] None [  ] Rash  [  ] Wound  [  ] Ulcer    HEENT:  [ x ] None  [  ] Sore Throat  [  ] Nasal congestion/ runny nose  [  ] Photophobia [  ] Neck pain      Chest:  [  ] None   [ x ] SOB  [ x ] Cough  [  ] None    Cardiovascular:   [ x ] None  [  ] CP  [  ] Palpitation    Gastrointestinal:  [ x ] None  [  ] Abd pain   [  ] Nausea    [  ] Vomiting   [  ] Diarrhea	     Genitourinary:  [ x ] None [  ] Polyuria   [  ] Urgency  [  ] Frequency  [  ] Dysuria    [  ]  Hematuria       Musculoskeletal:  [  ] None [  ] Back Pain	[  ] Body aches  [  ] Joint pain  [ x ] Weakness    Neurological: [  ] None [  ]Dizziness  [  ]Visual Disturbance  [  ]Headaches   [ x ] Weakness      PHYSICAL EXAM:  Vital Signs Last 24 Hrs  T(C): 36.6 (02 Feb 2018 05:20), Max: 36.6 (02 Feb 2018 05:20)  T(F): 97.8 (02 Feb 2018 05:20), Max: 97.8 (02 Feb 2018 05:20)  HR: 132 (02 Feb 2018 05:20) (73 - 133)  BP: 100/76 (02 Feb 2018 05:20) (100/76 - 128/78)  BP(mean): --  RR: 19 (02 Feb 2018 05:20) (19 - 20)  SpO2: 100% (02 Feb 2018 05:20) (85% - 100%)    Constitutional:    HEENT: [ x ] Wnl  [  ] Pharyngeal congestion    Neck:  [ x ] Supple  [  ]Lymphadenopathy  [ x ] No JVD   [  ] JVD  [  ] Masses   [  ] WNL    CHEST/Respiratory:  [  ]Clear to auscultation  [ x ] Rales   [  ] Rhonchi   [  ] Wheezing     [  ] Chest Tenderness      Cardiovascular:  [ x ] Reg S1 S2   [  ] Irreg S1 S2   [ x ]No Murmur  [  ] +ve Murmurs  [  ]Systolic [  ]Diastolic      Abdomen:  [ x ] Soft  [ x ] No tendrerness  [  ] Tenderness  [  ] Organomegaly  [  ] ABD Distention  [  ] Rigidity                       [ x ] No Regidity                       [ x ] No Rebound Tenderness  [  ] No Guarding Rigidity  [  ] Rebound Tenderness[  ] Guarding Rigidity                          [ x ]  +ve Bowel Sounds  [  ] Decreased Bowel Sounds    [  ] Absent Bowel Sounds                            Extremities: [ x ] No edema [  ] Edema  [  ] Clubbing   [  ] Cyanosis                         [ x ] No Tender Calf muscles  [  ] Tender Calf muscles                        [ x ] Palpable peripheral pulses    Neurological: [ x ] Awake  [ x ] Alert  [ x ] Oriented  x 2                            [  ] Confused  [  ] Drowzy  [  ] repond to painful stimuli  [  ] Unresponsive    Skin:  [ x ] Intact [  ] Redness [  ] Thrombophlebitis  [  ] Rashes  [  ] Dry  [  ] Ulcers    Ortho:  [  ] Joint Swelling  [  ] Joint erythema [  ] Joint tenderness                [  ] Increased temp. to touch  [  ] DJD [ x ] WNL          LABS/DIAGNOSTIC TESTS                        16.0   10.8  )-----------( 231      ( 31 Jan 2018 06:03 )             50.2   01-31    130<L>  |  95<L>  |  36<H>  ----------------------------<  94  4.5   |  26  |  1.27    Ca    8.4      31 Jan 2018 06:03  Phos  3.3     01-31  Mg     2.4     01-31    TPro  5.3<L>  /  Alb  1.7<L>  /  TBili  0.6  /  DBili  x   /  AST  21  /  ALT  43  /  AlkPhos  65  01-30    CAPILLARY BLOOD GLUCOSE      POCT Blood Glucose.: 169 mg/dL (01 Feb 2018 21:38)  POCT Blood Glucose.: 176 mg/dL (01 Feb 2018 16:36)  POCT Blood Glucose.: 171 mg/dL (01 Feb 2018 11:39)  POCT Blood Glucose.: 170 mg/dL (01 Feb 2018 08:01)  POCT Blood Glucose.: 52 mg/dL (01 Feb 2018 07:39)  POCT Blood Glucose.: 52 mg/dL (01 Feb 2018 07:37)      CULTURES:   Culture - Blood (01.28.18 @ 22:10)    Specimen Source: .Blood Blood    Culture Results:   No growth to date.    Culture - Blood (01.28.18 @ 22:10)    -  Coagulase negative Staphylococcus: Detec    Gram Stain:   Growth in anaerobic bottle: Gram Positive Cocci in Clusters    Specimen Source: .Blood Blood    Organism: Blood Culture PCR    Culture Results:   Growth in anaerobic bottle: Gram Positive Cocci in Clusters    Organism Identification: Blood Culture PCR    Method Type: PCR        RADIOLOGY  CXR:    EXAM:  XR CHEST PORTABLE ROUTINE 1V                            PROCEDURE DATE:  01/30/2018          INTERPRETATION:  AP semierect chest on January 30, 2018 at 9:59 AM.   Patient has right lower lobe consolidation, hypoosmolality, and   hyponatremia.    Heart enlargement again noted.    Right lower lobe consolidation again noted similar to January 29.    In addition there is an increasing left lower lobe infiltrate.    IMPRESSION: Increasing findings particularly at left base.        Assessment and Recommendation:   82 F from home lives with son, ambulates with personal assistance, PMH of right side lung Ca stage 3b S/P chemoradiation therapy since last year, COPD, DM, brought in for SOB for few days. Dyspnea Progressed to severe respiratory distress.   Patient was admitted to ICU for pneumonia and respiratory failure and was started on BIPAP mask, and IV Zosyn and Azithromycin.  One Blood culture grew staph coag negative most likely represent contamination.  1/30/18 Sodium is low but improving, bur CXR showed increasing left lower lobe infiltrate, and patient was transferred to regular floor.      Problem/Recommendation - 1:  Problem: Pneumonia.   Recommendation:   1- Continue Meropenem 1 gm IVPB q 8 houres.  2- Continue Azithromycin.  3- O2 as needed.  4- Pulmonary management and follow up.  5- F/u repeat CXR.  6- CBC and CMP for follow up.    Problem/Recommendation - 2:  ·  Problem: Respiratory failure.    Recommendation:   1- Bronchodilators.  2- O2 as needed.  3- Pulmonary management.  4- Steroids as per primary, and pulmonary team if needed.  5- Urine, and sputum cultures.     Problem/Recommendation - 3:  ·  Problem: Hyponatremia.    Recommendation:   1- Fluid and electrolytes management.  2- CBC and CMP follow up.     Problem/Recommendation - 4:  ·  Problem: Diabetes mellitus.    Recommendation:   1- Blood sugar monitoring and control.  2- Accu-Cheks with coverage.  3- 1800 val ADA diet.  4- Follow HB A1C.     Discussed with medical resident.

## 2018-02-05 NOTE — H&P ADULT - ASSESSMENT
82 F from home hospice care PMH of COPD, right side lung Ca stage 3b brought in for  respiratory distress anf hypoxia    Admit to AI/ SCU in 4N  pt currently comfortable 82 m from home hospice care PMH of COPD, right side lung Ca stage 3b brought in for  respiratory distress anf hypoxia    Admit to AI/ SCU in 4N  pt currently comfortable

## 2018-02-05 NOTE — ED PROVIDER NOTE - OBJECTIVE STATEMENT
82 y.o. male SHAW was recently d/c home for COPD exac & PNA, pt is currently under hospice care.  As per friend, pt is on BiPAP @ home, but noted the O2 sat have been droping, pt c/o throat pain, mild coughing, decreased appetite, no fever, vomting 82 y.o. male SHAW was recently d/c home for COPD exac & PNA, pt is currently under hospice care.  As per friend, pt is on BiPAP @ home, but noted the O2 sat have been dropping, pt c/o throat pain, mild coughing, decreased appetite, no fever, vomiting

## 2018-02-05 NOTE — ED PROVIDER NOTE - PROGRESS NOTE DETAILS
spoke with Dr. Pyle, will admit pt to special care unit pt with significant dehydration, will admit pt, d/w Dr. Jose, to admit to AI

## 2018-02-05 NOTE — H&P ADULT - ATTENDING COMMENTS
Patient seen and examined at bedside with resident upon consultation request in the ED.  This is 82 year old man with stage 3B lung cancer s/p chemo/RT, COPD (on home oxygen), under hospice care was brought to the ED with worsening SOB. In the ED he was placed on bipap with good response. Recently hospitalized for pneumonia.  VS stable, elderly man resting comfortably in bed on bipap support, awake, alert, follows commands, Lungs- decreased air entry bilaterally,   LABS reviewed: SHAVON with B/CR 53/1.48, hyperkalemia K 5.7, elevated LFTs, CXR reviewed- bibasilar interstitial/alveolar infiltrative changes which was present in prior imaging.  A/P  82M with Stage 3 Lung cancer s/p Chemo/RT with Likely COPD exacerbation and placed on bipap in the ED. Prerenal SHAVON with mild hyperkalemia.  -Admitted to Advanced Illness unit.  -Continue bipap support.  -Bronchodilators, Solu-medrol  -Pain management as needed.  -NPO for now with gentle IV fluid hydration.  -Correct hyperkalemia and repeat BMP.  -Continue all other supportive/comfort care measures  -Patient is DNR/DNI.

## 2018-02-05 NOTE — ED ADULT NURSE NOTE - OBJECTIVE STATEMENT
As per family friend pt became hypoxic. Pt with peripheral edema of arms and ecchymosis. Sacrum with DTI. Pt on BIPAP

## 2018-02-05 NOTE — H&P ADULT - GASTROINTESTINAL DETAILS
bowel sounds normal/nontender/no distention/no masses palpable/no bruit/no rebound tenderness/soft/normal

## 2018-02-05 NOTE — H&P ADULT - NEUROLOGICAL DETAILS
no spontaneous movement/sensation intact/deep reflexes intact/cranial nerves intact/responds to pain/responds to verbal commands

## 2018-02-05 NOTE — ED PROVIDER NOTE - CARE PLAN
Principal Discharge DX:	SOB (shortness of breath) Principal Discharge DX:	SOB (shortness of breath)  Secondary Diagnosis:	Dehydration  Secondary Diagnosis:	Hyperkalemia

## 2018-02-05 NOTE — ED PROVIDER NOTE - MEDICAL DECISION MAKING DETAILS
pt with lung ca, currently on hospice, now with increasing sob, will get labs, d/w Dr. Pyle, Palliative Care

## 2018-02-05 NOTE — H&P ADULT - HISTORY OF PRESENT ILLNESS
82 F from home hospice care PMH of right side lung Ca stage 3b S/P chemoradiation therapy since last year, COPD (on bipap and O2 at home), DM, brought in for SOB , chocking like feeling. · Pt was recently d/c home for COPD exacerbation & PNA. As per friend, pt is on BiPAP @ home, but noted the O2 sat have been dropping to 80s at home , family was scared and brought pt to the hospital. Pt c/o throat pain, mild coughing, decreased appetite, no fever, vomiting, cough, difficulty breathing, SOB. Dyspnea is associated with weakness, confusion, cough and decreased appetite. He is confused for 1 day. Cough is productive with scant sputum. Patient was smoker in past.     Pt speaks mandarin , Family friend at bedside helped in translation . Patient had no fever, chest pain, rash, abdominal pain, diarrhea, nausea, vomiting,   Former smoker, alcohol abuse and illicit drug use. 82 M from home hospice care PMH of right side lung Ca stage 3b S/P chemoradiation therapy since last year, COPD (on bipap and O2 at home), DM, brought in for SOB , chocking like feeling. · Pt was recently d/c home for COPD exacerbation & PNA. As per friend, pt is on BiPAP @ home, but noted the O2 sat have been dropping to 80s at home , family was scared and brought pt to the hospital. Pt c/o throat pain, mild coughing, decreased appetite, no fever, vomiting, cough, difficulty breathing, SOB. Dyspnea is associated with weakness, confusion, cough and decreased appetite. He is confused for 1 day. Cough is productive with scant sputum. Patient was smoker in past.     Pt speaks mandarin , Family friend at bedside helped in translation . Patient had no fever, chest pain, rash, abdominal pain, diarrhea, nausea, vomiting,   Former smoker, alcohol abuse and illicit drug use. 82 M from home hospice care PMH of right side lung Ca stage 3b S/P chemoradiation therapy since last year, COPD (on bipap and O2 at home), DM, brought in for SOB , chocking like feeling. · Pt was recently d/c home for COPD exacerbation & PNA. As per friend, pt is on BiPAP @ home, but noted the O2 sat have been dropping to 80s at home , family was scared and brought pt to the hospital. Pt c/o throat pain, mild coughing, decreased appetite, no fever, vomiting, cough, difficulty breathing, SOB. Dyspnea is associated with weakness, confusion, cough and decreased appetite. He is confused for 1 day. Cough is productive with scant sputum. Patient was smoker in past.     Pt speaks mandarin , Family friend at bedside helped in translation . Patient had no fever, chest pain, rash, abdominal pain, diarrhea, nausea, vomiting,   Former smoker, alcohol abuse and illicit drug use.    MOLST - DNR/DNI

## 2018-02-06 PROBLEM — C34.90 MALIGNANT NEOPLASM OF UNSPECIFIED PART OF UNSPECIFIED BRONCHUS OR LUNG: Chronic | Status: ACTIVE | Noted: 2018-01-01

## 2018-02-06 NOTE — PROGRESS NOTE ADULT - ATTENDING COMMENTS
82 male with advanced lung cancer on home hospice, brought back in to the hospital because family could not adequately care for him.  Will plan for transition to hospice at a facility.

## 2018-02-06 NOTE — PROGRESS NOTE ADULT - ASSESSMENT
82 m from home hospice ([facility])  PMH of COPD, right side lung Ca stage 3b brought in for SOB. Pt is on BiPAP @ home, family noted the O2 sat have been dropping to 80s at home, so brought pt to the hospital. Family had been talking to the [facility] nurse about LTC placement with hospice,

## 2018-02-06 NOTE — CONSULT NOTE ADULT - PROBLEM SELECTOR RECOMMENDATION 3
Patient increasingly weak, requiring more assistance with ADLs; wife no longer able to take care of him and they want LTC with hospice; ECOG 2

## 2018-02-06 NOTE — DISCHARGE NOTE FOR THE EXPIRED PATIENT - HOSPITAL COURSE
82 M from home hospice care PMH of right side lung Ca stage 3b S/P chemoradiation therapy since last year, COPD (on bipap and O2 at home), DM, brought in for SOB. · Pt was recently d/c home   with hospice after being admitted for COPD exacerbation & PNA. Pt is on BiPAP @ home, but noted the O2 sat have been dropping to 80s at home, family was scared and brought pt to the hospital.   Patient was SOB , was placed on BIPAP , but did poorly and  at 5:20 pm , pt family was contacted at [phone] , pt representative Adriel Andre  was informed about pt demise , she is informing the family. Awaiting autopsy decision from family. 82 M from home hospice care PMH of right side lung Ca stage 3b S/P chemoradiation therapy since last year, COPD (on bipap and O2 at home), DM, brought in for SOB. · Pt was recently d/c home   with hospice after being admitted for COPD exacerbation & PNA. Pt is on BiPAP @ home, but noted the O2 sat have been dropping to 80s at home, family was scared and brought pt to the hospital.   Patient was SOB , was placed on BIPAP , but did poorly and  at 5:20 pm , pt family was contacted at [phone] , pt representative Adriel Andre  was informed about pt demise , she is informing the family. Awaiting autopsy decision from family.   Miss Andre and another family friend informing wife , denied autopsy.

## 2018-02-06 NOTE — ED ADULT NURSE REASSESSMENT NOTE - NS ED NURSE REASSESS COMMENT FT1
Pt continues to be MD yuri. José Antonio made aware with EKG taken will continue to monitor for distress

## 2018-02-06 NOTE — CONSULT NOTE ADULT - PROBLEM SELECTOR RECOMMENDATION 4
Spoke with patient with the assistance of [facility] nurse translating in Mandarin.  The patient understands the plan and he is agreeable with LTC with hospice.  He is DNR/DNI.  MOLST previously completed.

## 2018-02-06 NOTE — PROGRESS NOTE ADULT - SUBJECTIVE AND OBJECTIVE BOX
DNR [x ]   DNI  [ x ]    INTERVAL HPI/OVERNIGHT EVENTS:  no acute events;  PRESSORS: [ ] YES [x ] NO  WHICH:    levoFLOXacin IVPB 250 milliGRAM(s) IV Intermittent every 24 hours      Pulmonary:  ALBUTerol    90 MICROgram(s) HFA Inhaler 1 Puff(s) Inhalation every 4 hours  ALBUTerol    90 MICROgram(s) HFA Inhaler 2 Puff(s) Inhalation every 6 hours PRN  ALBUTerol/ipratropium for Nebulization 3 milliLiter(s) Nebulizer every 6 hours  tiotropium 18 MICROgram(s) Capsule 1 Capsule(s) Inhalation daily    Hematalogic:    Other:  methylPREDNISolone sodium succinate Injectable 40 milliGRAM(s) IV Push every 8 hours  morphine  - Injectable 0.5 milliGRAM(s) IV Push every 3 hours PRN  sodium chloride 0.9%. 1000 milliLiter(s) IV Continuous <Continuous>    ALBUTerol    90 MICROgram(s) HFA Inhaler 1 Puff(s) Inhalation every 4 hours  ALBUTerol    90 MICROgram(s) HFA Inhaler 2 Puff(s) Inhalation every 6 hours PRN  ALBUTerol/ipratropium for Nebulization 3 milliLiter(s) Nebulizer every 6 hours  levoFLOXacin IVPB 250 milliGRAM(s) IV Intermittent every 24 hours  methylPREDNISolone sodium succinate Injectable 40 milliGRAM(s) IV Push every 8 hours  morphine  - Injectable 0.5 milliGRAM(s) IV Push every 3 hours PRN  sodium chloride 0.9%. 1000 milliLiter(s) IV Continuous <Continuous>  tiotropium 18 MICROgram(s) Capsule 1 Capsule(s) Inhalation daily    Drug Dosing Weight  Height (cm): 162.56 (05 Feb 2018 16:56)  Weight (kg): 63.5 (05 Feb 2018 16:56)  BMI (kg/m2): 24 (05 Feb 2018 16:56)  BSA (m2): 1.68 (05 Feb 2018 16:56)    CENTRAL LINE: [ ] YES [x ] NO  LOCATION:   DATE INSERTED:  REMOVE: [ ] YES [ ] NO  EXPLAIN:    CURIEL: [ ] YES [ x] NO    DATE INSERTED:  REMOVE:  [ ] YES [ ] NO  EXPLAIN:    A-LINE:  [ ] YES [x ] NO  LOCATION:   DATE INSERTED:  REMOVE:  [ ] YES [ ] NO  EXPLAIN:    PAST MEDICAL & SURGICAL HISTORY:  Diabetes  Lung cancer        ABG - ( 05 Feb 2018 23:12 )  pH: 7.43  /  pCO2: 30    /  pO2: 92    / HCO3: 20    / Base Excess: -2.9  /  SaO2: 96                          PHYSICAL EXAM:    GENERAL: NAD appears comfortable  HEAD:  Atraumatic, Normocephalic  EYES: EOMI, PERRLA, conjunctiva and sclera clear  ENMT: No tonsillar erythema, exudates, or enlargement;   NECK: Supple, No JVD, Normal thyroid  NERVOUS SYSTEM:  Alert, awake, Mandarin speaking   CHEST/LUNG: Clear to percussion bilaterally; No rales, rhonchi, wheezing, or rubs  HEART: Regular rate and rhythm; No murmurs, rubs, or gallops  ABDOMEN: Soft, Nontender, Nondistended; Bowel sounds present  EXTREMITIES:  2+ Peripheral Pulses, No clubbing, cyanosis, or edema  LYMPH: No lymphadenopathy noted  SKIN: No rashes or lesions      LABS:  CBC Full  -  ( 06 Feb 2018 10:28 )  WBC Count : 10.6 K/uL  Hemoglobin : 16.0 g/dL  Hematocrit : 50.5 %  Platelet Count - Automated : 93 K/uL  Mean Cell Volume : 98.8 fl  Mean Cell Hemoglobin : 31.2 pg  Mean Cell Hemoglobin Concentration : 31.6 gm/dL  Auto Neutrophil # : 10.0 K/uL  Auto Lymphocyte # : 0.4 K/uL  Auto Monocyte # : 0.1 K/uL  Auto Eosinophil # : 0.0 K/uL  Auto Basophil # : 0.0 K/uL  Auto Neutrophil % : 94.9 %  Auto Lymphocyte % : 3.6 %  Auto Monocyte % : 1.3 %  Auto Eosinophil % : 0.0 %  Auto Basophil % : 0.2 %    02-06    135  |  103  |  64<H>  ----------------------------<  206<H>  5.0   |  20<L>  |  1.54<H>    Ca    8.5      06 Feb 2018 10:28  Mg     2.5     02-05    TPro  5.3<L>  /  Alb  1.9<L>  /  TBili  0.9  /  DBili  x   /  AST  84<H>  /  ALT  96<H>  /  AlkPhos  81  02-06              [  ]  DVT Prophylaxis  [  ]  Nutrition, Brand, Rate         Goal Rate         Abdominal Nutritional Status -  Malnutrition   Cachexia      Morbid Obesity BMI >/=40    RADIOLOGY & ADDITIONAL STUDIES:  ***    [  ] Goals of Care Discussion with Family/Proxy/Other           Elements of Conversation Discussed: Patient/Family understanding of current illness   Advanced Directives                                                                       Prognosis  Treatment Options  Care Aligned with patient's wishes                                             TIME SPENT: 35 minutes

## 2018-02-06 NOTE — ED ADULT NURSE REASSESSMENT NOTE - NS ED NURSE REASSESS COMMENT FT1
pt resting comfortably in bed absent any distress or C/O pain BI-pap in place, O@ sat WNL. safety measures in place, able to make needs known with care provided PRN. PTs has elevated HR with MD. Chou aware and orders pending . PT asymptomatic to heart at time of note. Family at bedside

## 2018-02-06 NOTE — PROGRESS NOTE ADULT - PROBLEM SELECTOR PLAN 1
Metastatic lung cancer, no longer on treatment, recently started on home hospice with HS now family interested in residential hospice.

## 2018-02-06 NOTE — PROGRESS NOTE ADULT - PROBLEM SELECTOR PLAN 3
Patient increasingly weak, requiring more assistance with ADLs; wife no longer able to take care of him and they want LTC with hospice

## 2018-02-06 NOTE — CONSULT NOTE ADULT - SUBJECTIVE AND OBJECTIVE BOX
82 M from home hospice care PMH of right side lung Ca stage 3b S/P chemoradiation therapy since last year, COPD (on bipap and O2 at home), DM, brought in for SOB. · Pt was recently d/c home with hospice after being admitted for COPD exacerbation & PNA. As per friend, pt is on BiPAP @ home, but noted the O2 sat have been dropping to 80s at home , family was scared and brought pt to the hospital. Pt c/o throat pain, mild coughing, decreased appetite, no fever, vomiting, cough, difficulty breathing, SOB. Dyspnea is associated with weakness, confusion, cough and decreased appetite. He is confused for 1 day. Cough is productive with scant sputum. Patient was smoker in past.     Pt speaks mandarin , Family friend at bedside helped in translation . Patient had no fever, chest pain, rash, abdominal pain, diarrhea, nausea, vomiting,   Former smoker, alcohol abuse and illicit drug use.    MOLST - DNR/DNI (05 Feb 2018 21:05)      PAST MEDICAL & SURGICAL HISTORY:  Diabetes  Lung cancer      SOCIAL HISTORY:    Admitted from:  home assisted living [facility]   Substance abuse history:              Tobacco hx:                  Alcohol hx:              Home Opioid hx:  Scientologist:                                    Preferred Language:    Surrogate/HCP/Guardian:            Phone#:    FAMILY HISTORY:    Baseline ADLs (prior to admission):    Allergies    No Known Allergies    Intolerances      Present Symptoms:   Dyspnea:   Nausea/Vomiting:   Anxiety:  Depressed   Fatigue:  Loss of appetite:   Pain:                                location:          Review of Systems: [All others negative or Unable to obtain due to poor mentation]    MEDICATIONS  (STANDING):  ALBUTerol    90 MICROgram(s) HFA Inhaler 1 Puff(s) Inhalation every 4 hours  ALBUTerol/ipratropium for Nebulization 3 milliLiter(s) Nebulizer every 6 hours  insulin lispro (HumaLOG) corrective regimen sliding scale   SubCutaneous three times a day before meals  levoFLOXacin IVPB 250 milliGRAM(s) IV Intermittent every 24 hours  methylPREDNISolone sodium succinate Injectable 40 milliGRAM(s) IV Push every 8 hours  sodium chloride 0.9%. 1000 milliLiter(s) (125 mL/Hr) IV Continuous <Continuous>  tiotropium 18 MICROgram(s) Capsule 1 Capsule(s) Inhalation daily    MEDICATIONS  (PRN):  ALBUTerol    90 MICROgram(s) HFA Inhaler 2 Puff(s) Inhalation every 6 hours PRN Respiratory Distress  morphine  - Injectable 1 milliGRAM(s) IV Push every 6 hours PRN dyspnea      PHYSICAL EXAM:    Vital Signs Last 24 Hrs  T(C): 36.6 (06 Feb 2018 07:49), Max: 37.6 (06 Feb 2018 06:11)  T(F): 97.8 (06 Feb 2018 07:49), Max: 99.6 (06 Feb 2018 06:11)  HR: 125 (06 Feb 2018 07:49) (81 - 134)  BP: 107/87 (06 Feb 2018 07:49) (96/73 - 111/80)  BP(mean): --  RR: 20 (06 Feb 2018 07:49) (17 - 22)  SpO2: 98% (06 Feb 2018 07:49) (92% - 99%)    General: alert  oriented x ____    [lethargic distressed cachexia  nonverbal  unarousable verbal]  Karnofsky Performance Score/Palliative Performance Status Version2:     %    HEENT: normal  dry mouth  ET tube/trach oral lesions:  Lungs: comfortable tachypnea/labored breathing  excessive secretions  CV: normal  tachycardia  GI: normal  distended  tender  incontinent               PEG/NG/OG tube  constipation  last BM:   : normal  incontinent  oliguria/anuria  ortiz  Musculoskeletal: normal  weakness  edema             ambulatory  bedbound/wheelchair bound  Skin: normal  pressure ulcers: stage: edema: other:  Neuro: no deficits cognitive impairment dsyphagia/dysarthria paresis: other:  Oral intake ability: unable/only mouth care [minimal moderate full capability]  Diet: [NPO]    LABS:                        16.4   10.1  )-----------( 92       ( 05 Feb 2018 19:34 )             52.6     02-06    134<L>  |  101  |  58<H>  ----------------------------<  173<H>  4.9   |  21<L>  |  1.61<H>    Ca    8.7      06 Feb 2018 01:38  Mg     2.5     02-05    TPro  5.4<L>  /  Alb  1.9<L>  /  TBili  1.0  /  DBili  x   /  AST  89<H>  /  ALT  71<H>  /  AlkPhos  83  02-05        RADIOLOGY & ADDITIONAL STUDIES:    ADVANCE DIRECTIVES:   Advanced Care Planning discussion total time spent: 82 M from home hospice care PMH of right side lung Ca stage 3b S/P chemoradiation therapy since last year, COPD (on bipap and O2 at home), DM, brought in for SOB. · Pt was recently d/c home with hospice after being admitted for COPD exacerbation & PNA. Pt is on BiPAP @ home, but noted the O2 sat have been dropping to 80s at home, family was scared and brought pt to the hospital.     PAST MEDICAL & SURGICAL HISTORY:  Diabetes  Lung cancer    SOCIAL HISTORY:    Admitted from:  home hospice with [facility], lives with wife  Substance abuse history:              Tobacco hx:                  Alcohol hx:              Home Opioid hx:  Tenriism: Unknown                                   Preferred Language: Mandarin    Surrogate/HCP/Guardian:             Phone#:    FAMILY HISTORY:    Baseline ADLs (prior to admission):    Allergies    No Known Allergies    Intolerances      Present Symptoms:   Dyspnea:   Nausea/Vomiting:   Anxiety:  Depressed   Fatigue:  Loss of appetite:   Pain:                                location:          Review of Systems: [All others negative or Unable to obtain due to poor mentation]    MEDICATIONS  (STANDING):  ALBUTerol    90 MICROgram(s) HFA Inhaler 1 Puff(s) Inhalation every 4 hours  ALBUTerol/ipratropium for Nebulization 3 milliLiter(s) Nebulizer every 6 hours  insulin lispro (HumaLOG) corrective regimen sliding scale   SubCutaneous three times a day before meals  levoFLOXacin IVPB 250 milliGRAM(s) IV Intermittent every 24 hours  methylPREDNISolone sodium succinate Injectable 40 milliGRAM(s) IV Push every 8 hours  sodium chloride 0.9%. 1000 milliLiter(s) (125 mL/Hr) IV Continuous <Continuous>  tiotropium 18 MICROgram(s) Capsule 1 Capsule(s) Inhalation daily    MEDICATIONS  (PRN):  ALBUTerol    90 MICROgram(s) HFA Inhaler 2 Puff(s) Inhalation every 6 hours PRN Respiratory Distress  morphine  - Injectable 1 milliGRAM(s) IV Push every 6 hours PRN dyspnea      PHYSICAL EXAM:    Vital Signs Last 24 Hrs  T(C): 36.6 (06 Feb 2018 07:49), Max: 37.6 (06 Feb 2018 06:11)  T(F): 97.8 (06 Feb 2018 07:49), Max: 99.6 (06 Feb 2018 06:11)  HR: 125 (06 Feb 2018 07:49) (81 - 134)  BP: 107/87 (06 Feb 2018 07:49) (96/73 - 111/80)  BP(mean): --  RR: 20 (06 Feb 2018 07:49) (17 - 22)  SpO2: 98% (06 Feb 2018 07:49) (92% - 99%)    General: alert  oriented x ____    [lethargic distressed cachexia  nonverbal  unarousable verbal]  Karnofsky Performance Score/Palliative Performance Status Version2:     %    HEENT: normal  dry mouth  ET tube/trach oral lesions:  Lungs: comfortable tachypnea/labored breathing  excessive secretions  CV: normal  tachycardia  GI: normal  distended  tender  incontinent               PEG/NG/OG tube  constipation  last BM:   : normal  incontinent  oliguria/anuria  ortiz  Musculoskeletal: normal  weakness  edema             ambulatory  bedbound/wheelchair bound  Skin: normal  pressure ulcers: stage: edema: other:  Neuro: no deficits cognitive impairment dsyphagia/dysarthria paresis: other:  Oral intake ability: unable/only mouth care [minimal moderate full capability]  Diet: [NPO]    LABS:                        16.4   10.1  )-----------( 92       ( 05 Feb 2018 19:34 )             52.6     02-06    134<L>  |  101  |  58<H>  ----------------------------<  173<H>  4.9   |  21<L>  |  1.61<H>    Ca    8.7      06 Feb 2018 01:38  Mg     2.5     02-05    TPro  5.4<L>  /  Alb  1.9<L>  /  TBili  1.0  /  DBili  x   /  AST  89<H>  /  ALT  71<H>  /  AlkPhos  83  02-05        RADIOLOGY & ADDITIONAL STUDIES:    ADVANCE DIRECTIVES:   Advanced Care Planning discussion total time spent: 82 M from home hospice care PMH of right side lung Ca stage 3b S/P chemoradiation therapy since last year, COPD (on bipap and O2 at home), DM, brought in for SOB. · Pt was recently d/c home with hospice after being admitted for COPD exacerbation & PNA. Pt is on BiPAP @ home, but noted the O2 sat have been dropping to 80s at home, family was scared and brought pt to the hospital.     PAST MEDICAL & SURGICAL HISTORY:  Diabetes  Lung cancer    SOCIAL HISTORY:    Admitted from:  home hospice with [facility], lives with wife  Substance abuse history:              Tobacco hx: Former Smoker                 Alcohol hx:   Unknown           Home Opioid hx: None  Alevism: Unknown                                   Preferred Language: Mandarin    Surrogate/HCP/Guardian:  Jadyn Murray (Son) [phone]               FAMILY HISTORY: Unknown    Baseline ADLs (prior to admission): Mostly bedbound now, dependent on most ADLs    Allergies    No Known Allergies    Intolerances      Present Symptoms:   Dyspnea:   Nausea/Vomiting:   Anxiety:  Depressed   Fatigue:  Loss of appetite:   Pain:                                location:          Review of Systems: [All others negative or Unable to obtain due to poor mentation]    MEDICATIONS  (STANDING):  ALBUTerol    90 MICROgram(s) HFA Inhaler 1 Puff(s) Inhalation every 4 hours  ALBUTerol/ipratropium for Nebulization 3 milliLiter(s) Nebulizer every 6 hours  insulin lispro (HumaLOG) corrective regimen sliding scale   SubCutaneous three times a day before meals  levoFLOXacin IVPB 250 milliGRAM(s) IV Intermittent every 24 hours  methylPREDNISolone sodium succinate Injectable 40 milliGRAM(s) IV Push every 8 hours  sodium chloride 0.9%. 1000 milliLiter(s) (125 mL/Hr) IV Continuous <Continuous>  tiotropium 18 MICROgram(s) Capsule 1 Capsule(s) Inhalation daily    MEDICATIONS  (PRN):  ALBUTerol    90 MICROgram(s) HFA Inhaler 2 Puff(s) Inhalation every 6 hours PRN Respiratory Distress  morphine  - Injectable 1 milliGRAM(s) IV Push every 6 hours PRN dyspnea      PHYSICAL EXAM:    Vital Signs Last 24 Hrs  T(C): 36.6 (06 Feb 2018 07:49), Max: 37.6 (06 Feb 2018 06:11)  T(F): 97.8 (06 Feb 2018 07:49), Max: 99.6 (06 Feb 2018 06:11)  HR: 125 (06 Feb 2018 07:49) (81 - 134)  BP: 107/87 (06 Feb 2018 07:49) (96/73 - 111/80)  BP(mean): --  RR: 20 (06 Feb 2018 07:49) (17 - 22)  SpO2: 98% (06 Feb 2018 07:49) (92% - 99%)    General: alert  oriented x ____    [lethargic distressed cachexia  nonverbal  unarousable verbal]  Karnofsky Performance Score/Palliative Performance Status Version2:     %    HEENT: normal  dry mouth  ET tube/trach oral lesions:  Lungs: comfortable tachypnea/labored breathing  excessive secretions  CV: normal  tachycardia  GI: normal  distended  tender  incontinent               PEG/NG/OG tube  constipation  last BM:   : normal  incontinent  oliguria/anuria  ortiz  Musculoskeletal: normal  weakness  edema             ambulatory  bedbound/wheelchair bound  Skin: normal  pressure ulcers: stage: edema: other:  Neuro: no deficits cognitive impairment dsyphagia/dysarthria paresis: other:  Oral intake ability: unable/only mouth care [minimal moderate full capability]  Diet: [NPO]    LABS:                        16.4   10.1  )-----------( 92       ( 05 Feb 2018 19:34 )             52.6     02-06    134<L>  |  101  |  58<H>  ----------------------------<  173<H>  4.9   |  21<L>  |  1.61<H>    Ca    8.7      06 Feb 2018 01:38  Mg     2.5     02-05    TPro  5.4<L>  /  Alb  1.9<L>  /  TBili  1.0  /  DBili  x   /  AST  89<H>  /  ALT  71<H>  /  AlkPhos  83  02-05        RADIOLOGY & ADDITIONAL STUDIES:    ADVANCE DIRECTIVES:   Advanced Care Planning discussion total time spent: 82 M from home hospice care PMH of right side lung Ca stage 3b S/P chemoradiation therapy since last year, COPD (on bipap and O2 at home), DM, brought in for SOB. · Pt was recently d/c home with hospice after being admitted for COPD exacerbation & PNA. Pt is on BiPAP @ home, but noted the O2 sat have been dropping to 80s at home, family was scared and brought pt to the hospital. Family had been talking to the [facility] nurse about LTC placement with hospice, because they didn't feel like they could take care of him anymore.     PAST MEDICAL & SURGICAL HISTORY:  Diabetes  Lung cancer    SOCIAL HISTORY:    Admitted from:  home hospice with [facility], lives with wife  Substance abuse history:              Tobacco hx: Former Smoker                 Alcohol hx:   Unknown           Home Opioid hx: None  Church: Unknown                                   Preferred Language: Mandarin    Surrogate/HCP/Guardian:  Jadyn Murray (Son) [phone]               FAMILY HISTORY: Unknown    Baseline ADLs (prior to admission): Mostly bedbound now, dependent on most ADLs    Allergies:  No Known Allergies    Present Symptoms:   Dyspnea: Denies  Nausea/Vomiting: Denies  Anxiety: Denies  Depressed: Denies   Fatigue: Moderate  Loss of appetite: Moderate  Pain:   Denies                                Review of Systems: All others negative    MEDICATIONS  (STANDING):  ALBUTerol    90 MICROgram(s) HFA Inhaler 1 Puff(s) Inhalation every 4 hours  ALBUTerol/ipratropium for Nebulization 3 milliLiter(s) Nebulizer every 6 hours  insulin lispro (HumaLOG) corrective regimen sliding scale   SubCutaneous three times a day before meals  levoFLOXacin IVPB 250 milliGRAM(s) IV Intermittent every 24 hours  methylPREDNISolone sodium succinate Injectable 40 milliGRAM(s) IV Push every 8 hours  sodium chloride 0.9%. 1000 milliLiter(s) (125 mL/Hr) IV Continuous <Continuous>  tiotropium 18 MICROgram(s) Capsule 1 Capsule(s) Inhalation daily    MEDICATIONS  (PRN):  ALBUTerol    90 MICROgram(s) HFA Inhaler 2 Puff(s) Inhalation every 6 hours PRN Respiratory Distress  morphine  - Injectable 1 milliGRAM(s) IV Push every 6 hours PRN dyspnea    PHYSICAL EXAM:    Vital Signs Last 24 Hrs  T(C): 36.6 (06 Feb 2018 07:49), Max: 37.6 (06 Feb 2018 06:11)  T(F): 97.8 (06 Feb 2018 07:49), Max: 99.6 (06 Feb 2018 06:11)  HR: 125 (06 Feb 2018 07:49) (81 - 134)  BP: 107/87 (06 Feb 2018 07:49) (96/73 - 111/80)  RR: 20 (06 Feb 2018 07:49) (17 - 22)  SpO2: 98% (06 Feb 2018 07:49) (92% - 99%) on BIPAP    General: alert  oriented x _3___     verbal  Karnofsky Performance Score/Palliative Performance Status Version2: 30%    HEENT: normal    Lungs: comfortable  CV: Tachycardia  GI: normal    : normal    Musculoskeletal: weakness, bedbound  Skin: normal   Neuro: no deficits   Oral intake ability: Minimal  Diet: NPO    LABS:                        16.4   10.1  )-----------( 92       ( 05 Feb 2018 19:34 )             52.6     02-06    134<L>  |  101  |  58<H>  ----------------------------<  173<H>  4.9   |  21<L>  |  1.61<H>    Ca    8.7      06 Feb 2018 01:38  Mg     2.5     02-05    TPro  5.4<L>  /  Alb  1.9<L>  /  TBili  1.0  /  DBili  x   /  AST  89<H>  /  ALT  71<H>  /  AlkPhos  83  02-05    RADIOLOGY & ADDITIONAL STUDIES: < from: Xray Chest 1 View-PORTABLE IMMEDIATE (02.05.18 @ 19:26) >  IMPRESSION:  Bilateral lower lobe consolidations which may represent pneumonia, and   small bilateral pleural effusions, similar to the prior study.    < end of copied text >    ADVANCE DIRECTIVES: DNR/DNI, MOLST previously completed

## 2018-02-11 LAB
CULTURE RESULTS: SIGNIFICANT CHANGE UP
CULTURE RESULTS: SIGNIFICANT CHANGE UP
SPECIMEN SOURCE: SIGNIFICANT CHANGE UP
SPECIMEN SOURCE: SIGNIFICANT CHANGE UP

## 2018-05-01 ENCOUNTER — OUTPATIENT (OUTPATIENT)
Dept: OUTPATIENT SERVICES | Facility: HOSPITAL | Age: 83
LOS: 1 days | End: 2018-05-01
Payer: MEDICAID

## 2018-05-01 PROCEDURE — G9001: CPT

## 2018-05-03 DIAGNOSIS — R69 ILLNESS, UNSPECIFIED: ICD-10-CM

## 2018-05-04 PROBLEM — E11.9 TYPE 2 DIABETES MELLITUS WITHOUT COMPLICATIONS: Chronic | Status: ACTIVE | Noted: 2018-01-01

## 2019-08-27 NOTE — PATIENT PROFILE ADULT. - HEALTHCARE QUESTIONS, PROFILE
Vascular medicine inpatient follow-up:     Chief Complaint   Patient presents with   • Leg Swelling     Subjective:  HISTORY OF PRESENT ILLNESS:  Michael Arce Sr is a very pleasant 87 year old male with PMH DVT and PE previously taking Eliquis who developed bilateral subdural hygromas following a fall in July 2019. His Eliquis was held following this event, and he developed nearly occlusive DVT of the LLE. He is s/p IVC filter and was started on heparin infusion but this was d/c'ed due to worsening SDH. Patient transferred to NICU. Bilateral jessa holes for subdural hematoma were placed on 8/25/2019. Patient scheduled to undergo MMA embolization today at 1400.     Today this patient is sleeping upon entry into room. He is accompanied by his family. They deny any overnight events or any acute changes in his symptoms. They confirm his hip arthritis has been bothering him more lately due to LLE elevation to manage swelling secondary to acute DVT.    Review of Systems  A complete review of systems was performed and is otherwise negative unless as stated above.        Past Medical History:   Diagnosis Date   • Anemia, unspecified 02/10/2009   • Atherosclerotic heart disease    • Basal cell carcinoma 05/2019    BCC left cheek, excised by Dr. 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    • Rash    • Rheumatoid arthritis(714.0)    • Thrombocytopenia, unspecified (CMS/HCC) 04/21/2008     Past Surgical History:   Procedure Laterality Date   • Cabg, vein, single  07/2004    CABG, one vein   • Coronary artery bypass graft     • Ivc filter placement  08/20/2019    placed due to LLE DVT    • Joint replacement      • Laser surgery of eye  01/01/2004    2nd to glaucoma   • Remv cataract extracap insert lens      bilateral    • Total knee replacement  01/01/2008    right     Current Facility-Administered Medications   Medication Dose Route Frequency Provider Last Rate Last Dose   • sodium chloride 0.9% infusion   Intravenous Continuous SARAH Marie 75 mL/hr at 08/27/19 1119     • polyethylene glycol (GLYCOLAX, MIRALAX) packet 17 g  17 g Oral Daily Andrew Seo MD       • docusate sodium-sennosides (SENOKOT S) 50-8.6 MG 2 tablet  2 tablet Oral Nightly Andrew Seo MD       • bisacodyl (DULCOLAX) suppository 10 mg  10 mg Rectal Daily PRN Andrew Seo MD       • magnesium hydroxide (MILK OF MAGNESIA) 400 MG/5ML suspension 30 mL  30 mL Oral Daily PRN Andrew Seo MD       • morphine injection 1-2 mg  1-2 mg Intravenous Q2H PRN Octavia Bang MD   1 mg at 08/27/19 0456   • sodium chloride 0.9% infusion   Intravenous Continuous PRN SARAH Toure       • ceFAZolin (ANCEF) syringe 2,000 mg  2,000 mg Intravenous 3 times per day RAINA ToureNP   2,000 mg at 08/27/19 0621   • hydrALAZINE (APRESOLINE) injection 10-20 mg  10-20 mg Intravenous Q4H PRN Yonas Hayden MD   20 mg at 08/27/19 0708   • labetalol (NORMODYNE) injection 10-20 mg  10-20 mg Intravenous Q4H PRN Yonas Hayden MD       • niCARdipine (CARDENE) 25 mg in sodium chloride 0.9% 250 mL premix  0-15 mg/hr Intravenous Continuous Jeremy F Siegrist, MD   Stopped at 08/25/19 0714   • sodium chloride 0.9% infusion   Intravenous Continuous Efra Alvarez MD 75 mL/hr at 08/25/19 0800     • latanoprost (XALATAN) 0.005 % ophthalmic solution 1 drop  1 drop Both Eyes Nightly Juan Huggins MD   1 drop at 08/26/19 2047   • timolol (TIMOPTIC) 0.5 % ophthalmic solution 1 drop  1 drop Both Eyes QHS Juan Huggins MD   1 drop at 08/26/19 2047   • acetaminophen (TYLENOL) tablet 650 mg  650 mg Oral Q6H PRN Marco Hernandez MD    650 mg at 08/26/19 0930   • allopurinol (ZYLOPRIM) tablet 100 mg  100 mg Oral Daily [facility] LYNN Hernandez MD   100 mg at 08/27/19 0849   • donepezil (ARICEPT) tablet 5 mg  5 mg Oral Q Evening [facility] MD David   5 mg at 08/26/19 2051   • finasteride (PROSCAR) tablet 5 mg  5 mg Oral Daily [facility] MD David   5 mg at 08/27/19 0849   • levETIRAcetam (KepPRA) tablet 500 mg  500 mg Oral QHS [facility] MD David   500 mg at 08/26/19 2051   • levothyroxine (SYNTHROID, LEVOTHROID) tablet 137 mcg  137 mcg Oral QAM [facility] MD David   137 mcg at 08/27/19 0620   • simvastatin (ZOCOR) tablet 40 mg  40 mg Oral Nightly [facility] MD David   40 mg at 08/26/19 2051   • tamsulosin (FLOMAX) capsule 0.4 mg  0.4 mg Oral Daily [facility] Marco Hernandez MD   0.4 mg at 08/27/19 0849   • sodium chloride (NORMAL SALINE) 0.9 % bolus 500 mL  500 mL Intravenous PRPEDRITO Sandra MD       • dextrose 50 % injection 25 g  25 g Intravenous PRPEDRITO Sandra MD       • dextrose 5 % infusion   Intravenous Continuous PRPEDRITO Sandra MD       • glucagon (GLUCAGEN) injection 1 mg  1 mg Intramuscular PRPEDRITO Sandra MD       • dextrose (GLUTOSE) 40 % gel 15 g  15 g Oral PRN Abida Sandra MD       • sodium chloride 0.9 % flush bag 500 mL  500 mL Intravenous STEW Sandra MD       • insulin lispro (HumaLOG) sliding scale injection   Subcutaneous TID  Abida Sandra MD   2 Units at 08/26/19 1803   • potassium CHLORIDE (KLOR-CON M) alon ER tablet 20 mEq  20 mEq Oral Q4H PRPEDRITO Sandra MD       • potassium CHLORIDE (KLOR-CON) packet 20 mEq  20 mEq Per NG tube Q4H PRPEDRITO Sandra MD       • potassium CHLORIDE 20 mEq/100mL IVPB premix  20 mEq Intravenous Q4H PRN Abida Sandra MD       • potassium CHLORIDE (KLOR-CON M) alon ER tablet 40 mEq  40 mEq Oral Q4H PRN Abida Sandra MD       • potassium CHLORIDE (KLOR-CON) packet 40 mEq  40 mEq Per NG tube Q4H PRN Abida MONK  MD Boaz       • potassium CHLORIDE 20 mEq/100mL IVPB premix  40 mEq Intravenous Q4H PRPEDRITO Sandra MD       • magnesium sulfate 1 g in dextrose 5% 100 mL IVPB premix  1 g Intravenous Daily PRN Abida Sandra MD       • magnesium sulfate 2 g in 50 mL premix IVPB  2 g Intravenous Daily PRPEDRITO Sandra MD   Stopped at 08/24/19 1500   • magnesium sulfate 2 g in 50 mL premix IVPB  2 g Intravenous Q4H PRPEDRITO Sandra MD       • ondansetron (ZOFRAN) injection 4 mg  4 mg Intravenous BID PRPEDRITO Sandra MD   4 mg at 08/26/19 0950   • HYDROcodone-acetaminophen (NORCO) 5-325 MG per tablet 1 tablet  1 tablet Oral Q4H PRPEDRITO Sandra MD   1 tablet at 08/27/19 0703   • polyethylene glycol (GLYCOLAX, MIRALAX) packet 17 g  17 g Oral Daily PRPEDRITO Sandra MD       • docusate sodium-sennosides (SENOKOT S) 50-8.6 MG 2 tablet  2 tablet Oral Daily PRPEDRITO Sandra MD       • aluminum-magnesium hydroxide-simethicone (MAALOX) 200-200-20 MG/5ML suspension 30 mL  30 mL Oral Q4H PRPEDRITO Sandra MD         ALLERGIES:  No Known Allergies  Social History     Tobacco Use   • Smoking status: Former Smoker     Packs/day: 1.00     Years: 40.00     Pack years: 40.00     Types: Cigarettes     Last attempt to quit: 1/1/2004     Years since quitting: 15.6   • Smokeless tobacco: Never Used   Substance Use Topics   • Alcohol use: Yes     Alcohol/week: 0.0 - 1.0 standard drinks     Frequency: Never     Drinks per session: 1 or 2     Binge frequency: Never     Comment: Ocassionally    • Drug use: No     Family History   Problem Relation Age of Onset   • Hypertension Mother    • Stroke Mother    • Heart disease Mother    • Heart disease Father        PHYSICAL EXAMINATION:  VITAL SIGNS:  Blood pressure 138/67, pulse 68, temperature 97.3 °F (36.3 °C), temperature source Oral, resp. rate 18, height 5' 2\" (1.575 m), weight 67.4 kg, SpO2 96 %.  GENERAL: Sleeping, and in no acute  distress.   HEENT: McConnells holes bilaterally. Drains with serosanguinous fluid.    RESPIRATORY:  No respiratory distress. Lungs CTAB anteriorly.  CARDIOVASCULAR: RRR. No murmur, no rubs, no gallops.   ABDOMINAL: Bowel sounds present. Soft, nontender. No masses, pulsatile masses or bruits noted.  EXTREMITIES:  The feet and toes are warm with normal capillary refill. No evidence of phlegmasia. 2+ edema noted on the LLE. Trace edema on the RLE. SCD's in place on the RLE.   PERIPHERAL VASCULAR:  DP and PT are 2+ palpable on the RLE. DP and PT are 1+ palpable on the LLE.     CBC last 3  Recent Labs   Lab 08/27/19  0355 08/26/19  0415 08/25/19  0325   WBC 10.6 8.0 6.5   RBC 3.78* 3.50* 3.64*   HCT 32.5* 30.1* 31.1*   HGB 10.1* 9.4* 9.4*   * 106* 103*       BMP last 3  Recent Labs   Lab 08/27/19  0355 08/26/19  0415 08/24/19  0513   SODIUM 140 141 140   POTASSIUM 4.0 3.9 3.8   CHLORIDE 107 109* 108*   GLUCOSE 145* 95 87   CO2 23 22 27   BUN 26* 16 12   CREATININE 1.40* 1.20* 1.16   CALCIUM 8.8 8.4 8.3*       Coag last 3  Recent Labs   Lab 08/25/19  0325 08/24/19  1430 08/24/19  0513 08/23/19  0450  08/21/19  0506 08/20/19  1642   INR  --  1.1  --   --   --  1.1 1.2   PT  --  11.5  --   --   --  11.9* 11.9*   PTT 24  --  54* 57*   < >  --  23    < > = values in this interval not displayed.         VASCULAR IMAGING:  CT HEAD WO CONTRAST 8/27/2019:  IMPRESSION:  Continued improvement of postprocedural pneumocephalus and decrease of bilateral subdural fluid, including decreased right-sided hyperdense subdural hematoma. Minimal hyperdense subdural blood products remain  bilaterally.    CT HEAD WO CONTRAST 8/25/2019:  1.  Bilateral frontal and parietal jessa holes and associated subdural  drainage catheters are unchanged.   2.  Significant interval improved subdural pneumocephalus.  3.  Interval development of moderate right and small left subdural mixed  attenuation fluid collections including bilateral hyperdense  components  which are new versus August 25, 2019 and are therefore concerning for  continued active bleeding. Recommend close imaging follow-up.  4.  Improved bilateral supratentorial mass effect with decreasing size of  the subdural collections. No parenchymal hypoattenuation, no CT evidence  for cerebral ischemia.    CT head wo contrast 8/24/2019:  1.  Interval increase size of bilateral subdural hematomas with increased  layering hyperdense fluid indicating interval hemorrhage.  2.  Small right subarachnoid hemorrhage within the sylvian fissure has  increased in prominence compared to 8/22/2019.    CT HEAD WO CONTRAST 8/22/2019:  IMPRESSION:  1.  Bilateral subdural hematomas are stable in size.  2.  Hyperdense components in the dependent portions of the subdural hematomas are stable to slightly increased. Interval bleeding is difficult to exclude.    CT HEAD WO CONTRAST 8/19/2019:  IMPRESSION:  1. Slight interval increase in thickness of bilateral convexity subdural  hematomas.  2. New acute hyperdense component is present posteriorly on the left.  3. No interval increase in mass effect. No midline shift is evident.  4. A page was sent to the ordering provider at the time of interpretation.    IR Inferior Vena Cava Filter Placement 8/20/2019:   IMPRESSION:  1. Normal inferior venacavogram.  2. Successful placement of infrarenal IVC filter, Venatech; The patient  will be placed in the IVC filter tracking list and should return to the  Interventional Radiology department in 3 months for consultation regarding  optional filter retrieval..    US Lower Extremity Venous Duplex Bilateral 8/20/2019:  IMPRESSION:  Positive for nearly occlusive left lower extremity DVT extending from the common femoral vein to the calf. No evidence of DVT within the right lower extremity.        ASSESSMENT:    1. Acute deep vein thrombosis (DVT) of proximal vein of left lower extremity (CMS/HCC)    2. Subdural hygroma    3. Acute deep vein  thrombosis (DVT) of femoral vein of left lower extremity (CMS/[facility])    4. CKD (chronic kidney disease) stage 3, GFR 30-59 ml/min (CMS/[facility])    5. Essential hypertension, benign    6. Lymphoma of extranodal and solid organ sites (CMS/[facility])    7. Thrombocytopenia (CMS/[facility])    8. Type 2 diabetes mellitus without complication, without long-term current use of insulin (CMS/[facility])    9. Subdural hematoma without coma, without loss of consciousness, initial encounter (CMS/[facility])    10. History of pulmonary embolism    11. Bilateral leg edema    12. Long term (current) use of anticoagulants    13. S/P IVC filter    14. Contraindication to anticoagulation therapy        RECOMMENDATIONS:  This is an 87-year-old man with past medical history of DVT and PE previously taking Eliquis who developed bilateral subdural hygromas following a fall in July 2019. The patients Eliquis was held following SDH development and  He was found to have acute DVT of the LLE at follow-up visit. S/p IVC filter. He was placed on heparin infusion which was d/c'ed secondary to interval increases in SDH.    -S/p jessa hole placement bilaterally with drains in place.    -Continue to hold all anticoagulation until cleared by neurosurgery to resume  -The patient's edema has mildly increased, however pulses are still palpable, no evidence of phlegmasia.   -Continue to closely monitor LLE for signs of limb ischemia.  -Monitor both lower extremities for signs of new or worsening DVT  -Elevate LLE as much as tolerated.  -SCD's OK to be used on RLE. No SCD's on LLE.     -Patient scheduled for IR Cerebral Angiogram with MMA embollization  -Continue with frequent neuro checks.  -Awaiting clearance from neurosurgery/interventional for re-initiation of anticoagulation with the ultimate goal of slowly bridging him to coumadin for indefinite anticoagulation.    -We will continue to closely follow    Willie Aceves PA-C       n/a

## 2019-11-12 NOTE — PROGRESS NOTE ADULT - PROBLEM SELECTOR PLAN 4
C/w HSS  NPO for now  accuchecks WNL C/w HSS and lantus 10 units HS  NPO for now  accuchecks WNL never

## 2020-11-20 NOTE — H&P ADULT - PROBLEM SELECTOR PLAN 1
no Likely 2/2  PNA vs bronchitis   CXR shows b/l infiltrates   Tachypnea to 22 and desaturation to 80 SpO2 at home  Placed on BiPAP  currently comfortable on BiPAP  f/u ABG  will give zosyn for abx  nebs for  NPO for now  f/u BCx and RVP  f/u probnp Likely 2/2  COPD exacerbation vs worsening of lung cancer   CXR shows b/l infiltrates whcihc is not new  Tachypnea to 22 and desaturation to 80 SpO2 at home  Placed on BiPAP  currently comfortable on BiPAP  f/u ABG  will give levaquin for abx  nebs   NPO for now  f/u BCx and RVP  f/u probnp

## 2021-08-26 NOTE — ED ADULT TRIAGE NOTE - CCCP TRG CHIEF CMPLNT
shortness of breath Albendazole Pregnancy And Lactation Text: This medication is Pregnancy Category C and it isn't known if it is safe during pregnancy. It is also excreted in breast milk.

## 2022-06-29 NOTE — ED PROVIDER NOTE - CROS ED ENDOCRINE ALL NEG
negative... Island Pedicle Flap-Requiring Vessel Identification Text: The defect edges were debeveled with a #15 scalpel blade.  Given the location of the defect, shape of the defect and the proximity to free margins an island pedicle advancement flap was deemed most appropriate.  Using a sterile surgical marker, an appropriate advancement flap was drawn, based on the axial vessel mentioned above, incorporating the defect, outlining the appropriate donor tissue and placing the expected incisions within the relaxed skin tension lines where possible.    The area thus outlined was incised deep to adipose tissue with a #15 scalpel blade.  The skin margins were undermined to an appropriate distance in all directions around the primary defect and laterally outward around the island pedicle utilizing iris scissors.  There was minimal undermining beneath the pedicle flap.

## 2025-02-24 NOTE — CONSULT NOTE ADULT - PROBLEM/RECOMMENDATION-3
Physical Therapy    Visit Type: treatment  Co-treat with: Luis JORGENSEN PT; Alea, rehab aide.    Relevant History/Co-morbidities: R hip pain due to unwitnessed fall, 2/19/25  Patient previously admitted for fall 11/23/24; History of dementia  s/p right THR, direct anterior, WBAT, 2/22    SUBJECTIVE  Patient verbally agrees to allow the following to be present during session: son  Collaborated with LINDA Duncan.    Patient agreeable to therapy with encouragement. Reports having sore feet from wearing the compression garments and hospital socks.   Patient / Family Goal: return to previous functional status and maximize function    Pain     Location: right hip    At onset of session (out of 10): 6     OBJECTIVE     Cognitive Status   Level of Consciousness   - alert  Affect/Behavior    - anxious  Orientation    - Oriented to: person and place  Functional Communication   - Overall Communication Status: within functional limits   - Forms of Communication: verbal  Attention Span    - Attention: impaired - attends with cues to redirect  Following Direction   - follows one step commands with repetition and follows one step commands with increased time    Vitals:  BP in sitting on commode= 120/53 - asymptomatic    Patient Activity Tolerance: 1 to 2 activity to rest      Range of Motion (ROM)   (degrees unless noted; active unless noted; norms in ( ); negative=lacking to 0, positive=beyond 0)  WFL: LLE, RLE (RLE limited by pain)    Strength  (out of 5 unless noted, standard test position unless noted)   WFL: LLE, RLE, except as noted (RLE limited by pain (unable to lift foot up from ground))      Sitting Balance  (MOOK = base of support)  Static      - Trial 1 details: with double UE support, with back unsupported, with double LE support and stand by assist    Standing Balance  (MOOK = base of support)  Firm Surface: Double Leg      - Static, Eyes Open       - Trial 1 details: with double UE support and total assist (min x1 + mod  x1)     - Dynamic, Eyes Open       - Trial 1 details: with double UE support and total assist (min-mod x1 + mod x1)  Utilized 2ww in standing. Physical assist required to maintain upright posture due to weakness.        Bed Mobility  - Supine to sit: total assist - non-dependent, with verbal cues, with tactile cues (maxA x2)  Cues for sequencing and hand placement. Required physical assist for trunk and tavon LE's, utilized TAPS system for assist  Transfers  Assistive devices: gait belt, 2-wheeled walker  - Sit to stand: total assist - non-dependent, with verbal cues, with tactile cues (modA x1 + Geronimo x1)  - Stand to sit: total assist - non-dependent, with verbal cues, with tactile cues (Geronimo x2)  - Stand pivot: total assist - non-dependent, with verbal cues, with tactile cues (modA x1 + Geronimo x1; performed x1)  - Toilet: total assist - non-dependent, with verbal cues, with tactile cues (utilized commode; Geronimo x2)  Repetitive cues for hand placement, sequencing, focusing on task at hand. Physical assist required for maintenance of upright posture and stability at walker. Dependent for clara care.     Ambulation / Gait  - Assist Level: not attempted due to not medically appropriate or safe        Interventions     Training provided: activity tolerance, balance retraining, body mechanics, compensatory techniques, cognitive training, breathing/relaxation, safety training, positioning, transfer training, use of assistive device and bed mobility training    Skilled input: Verbal instruction/cues and tactile instruction/cues  Verbal Consent: Writer verbally educated and received verbal consent for hand placement, positioning of patient, and techniques to be performed today from patient for clothing adjustments for techniques, therapist position for techniques and hand placement and palpation for techniques as described above and how they are pertinent to the patient's plan of care.         Education:   - Present and ready to  learn: patient  Education provided during session:  - pain management, use of assistive device, transfer technique and safety  - Results of above outlined education: Verbalizes understanding and Needs reinforcement    ASSESSMENT   Progress: slow progress  Interfering components: decreased activity tolerance    Discharge needs based on today's assessment:   - Current level of function: significantly below baseline level of function   - Therapy needs at discharge: therapy 5 or more times per week   - Activities of daily living (ADLs) requiring support at discharge: bed mobility, transfers and ambulation   - Instrumental activities of daily living (IADLs) requiring support at discharge: community mobility   - Impairments that require further therapy intervention: activity tolerance, pain, ROM, strength and balance    AM-PAC  - Generalized Prior Level of Function: IND/MOD I ([facility] 22-24)       Key: MOD A=moderate assistance, IND/MOD I=independent/modified independent  - Generalized Current Level of Function     - Current Mobility Score: 6       AM-PAC Scoring Key= >21 Modified Independent; 20-21 Supervision; 18-19 Minimal assist; 13-17 Moderate assist; 9-12 Max assist; <9 Total assist      Pain at End of Session: , location: R hip/ thigh with movement, did not rate    PLAN (while hospitalized)  Suggestions for next session as indicated: Bed mobility, transfers (2ww versus stedy), trial ambulation if appropriate    PT Frequency: 3-5 x per week      PT/OT Mobility Equipment for Discharge: Pt owns 2ww, tbd  PT/OT ADL Equipment for Discharge: tbd  Agreement to plan and goals: patient agrees with goals and treatment plan        GOALS  Review Date: 2/27/2025  Long Term Goals: (to be met by time of discharge from hospital)  Sit to supine: Patient will complete sit to supine stand by assist.  Status: progressing/ongoing  Supine to sit: Patient will complete supine to sit stand by assist.  Status: progressing/ongoing  Sit to  stand: Patient will complete sit to stand transfer with 2-wheeled walker, modified independent.   Status: progressing/ongoing  Stand to sit: Patient will complete stand to sit transfer with 2-wheeled walker, modified independent.   Status: progressing/ongoing  Ambulation (even): Patient will ambulate on even surface for 25 feet with 2-wheeled walker, modified independent.   Status: not met  Documented in the chart in the following areas: Assessment/Plan.      Patient at End of Session:   Location: in chair  Safety measures: alarm system in place/re-engaged and call light within reach  Handoff to: nurse      I was in the immediate presence of the student and directed the student’s performance of the services. I am responsible for all treatment, assessment, documentation, and billing rendered for this patient.   Luis Padilla, PT        Therapy procedure time and total treatment time can be found documented on the Time Entry flowsheet   DISPLAY PLAN FREE TEXT